# Patient Record
Sex: FEMALE | Race: WHITE | NOT HISPANIC OR LATINO | Employment: OTHER | URBAN - METROPOLITAN AREA
[De-identification: names, ages, dates, MRNs, and addresses within clinical notes are randomized per-mention and may not be internally consistent; named-entity substitution may affect disease eponyms.]

---

## 2022-08-23 ENCOUNTER — APPOINTMENT (OUTPATIENT)
Dept: CT IMAGING | Facility: HOSPITAL | Age: 42
End: 2022-08-23

## 2022-08-23 ENCOUNTER — APPOINTMENT (OUTPATIENT)
Dept: GENERAL RADIOLOGY | Facility: HOSPITAL | Age: 42
End: 2022-08-23

## 2022-08-23 ENCOUNTER — HOSPITAL ENCOUNTER (EMERGENCY)
Facility: HOSPITAL | Age: 42
Discharge: HOME | End: 2022-08-23
Attending: STUDENT IN AN ORGANIZED HEALTH CARE EDUCATION/TRAINING PROGRAM | Admitting: STUDENT IN AN ORGANIZED HEALTH CARE EDUCATION/TRAINING PROGRAM

## 2022-08-23 VITALS
OXYGEN SATURATION: 100 % | DIASTOLIC BLOOD PRESSURE: 78 MMHG | TEMPERATURE: 97.6 F | SYSTOLIC BLOOD PRESSURE: 111 MMHG | BODY MASS INDEX: 27.07 KG/M2 | WEIGHT: 182.76 LBS | HEIGHT: 69 IN | RESPIRATION RATE: 16 BRPM | HEART RATE: 62 BPM

## 2022-08-23 DIAGNOSIS — R91.1 PULMONARY NODULE 1 CM OR GREATER IN DIAMETER: Primary | ICD-10-CM

## 2022-08-23 LAB
ALBUMIN SERPL-MCNC: 4.7 G/DL (ref 3.5–5.2)
ALBUMIN/GLOB SERPL: 2 G/DL
ALP SERPL-CCNC: 51 U/L (ref 39–117)
ALT SERPL W P-5'-P-CCNC: 16 U/L (ref 1–33)
ANION GAP SERPL CALCULATED.3IONS-SCNC: 11.3 MMOL/L (ref 5–15)
AST SERPL-CCNC: 13 U/L (ref 1–32)
BASOPHILS # BLD AUTO: 0.02 10*3/MM3 (ref 0–0.2)
BASOPHILS NFR BLD AUTO: 0.4 % (ref 0–1.5)
BILIRUB SERPL-MCNC: 0.7 MG/DL (ref 0–1.2)
BUN SERPL-MCNC: 15 MG/DL (ref 6–20)
BUN/CREAT SERPL: 16.1 (ref 7–25)
CALCIUM SPEC-SCNC: 9.4 MG/DL (ref 8.6–10.5)
CHLORIDE SERPL-SCNC: 106 MMOL/L (ref 98–107)
CO2 SERPL-SCNC: 22.7 MMOL/L (ref 22–29)
CREAT SERPL-MCNC: 0.93 MG/DL (ref 0.57–1)
DEPRECATED RDW RBC AUTO: 36.9 FL (ref 37–54)
EGFRCR SERPLBLD CKD-EPI 2021: 79.4 ML/MIN/1.73
EOSINOPHIL # BLD AUTO: 0.04 10*3/MM3 (ref 0–0.4)
EOSINOPHIL NFR BLD AUTO: 0.8 % (ref 0.3–6.2)
ERYTHROCYTE [DISTWIDTH] IN BLOOD BY AUTOMATED COUNT: 11.5 % (ref 12.3–15.4)
GLOBULIN UR ELPH-MCNC: 2.4 GM/DL
GLUCOSE SERPL-MCNC: 86 MG/DL (ref 65–99)
HCT VFR BLD AUTO: 36.7 % (ref 34–46.6)
HGB BLD-MCNC: 12.7 G/DL (ref 12–15.9)
HOLD SPECIMEN: NORMAL
HOLD SPECIMEN: NORMAL
IMM GRANULOCYTES # BLD AUTO: 0.01 10*3/MM3 (ref 0–0.05)
IMM GRANULOCYTES NFR BLD AUTO: 0.2 % (ref 0–0.5)
LYMPHOCYTES # BLD AUTO: 1.78 10*3/MM3 (ref 0.7–3.1)
LYMPHOCYTES NFR BLD AUTO: 36.8 % (ref 19.6–45.3)
MCH RBC QN AUTO: 30.7 PG (ref 26.6–33)
MCHC RBC AUTO-ENTMCNC: 34.6 G/DL (ref 31.5–35.7)
MCV RBC AUTO: 88.6 FL (ref 79–97)
MONOCYTES # BLD AUTO: 0.4 10*3/MM3 (ref 0.1–0.9)
MONOCYTES NFR BLD AUTO: 8.3 % (ref 5–12)
NEUTROPHILS NFR BLD AUTO: 2.59 10*3/MM3 (ref 1.7–7)
NEUTROPHILS NFR BLD AUTO: 53.5 % (ref 42.7–76)
NRBC BLD AUTO-RTO: 0 /100 WBC (ref 0–0.2)
NT-PROBNP SERPL-MCNC: 242.9 PG/ML (ref 0–450)
PLATELET # BLD AUTO: 248 10*3/MM3 (ref 140–450)
PMV BLD AUTO: 9.6 FL (ref 6–12)
POTASSIUM SERPL-SCNC: 4 MMOL/L (ref 3.5–5.2)
PROT SERPL-MCNC: 7.1 G/DL (ref 6–8.5)
QT INTERVAL: 412 MS
RBC # BLD AUTO: 4.14 10*6/MM3 (ref 3.77–5.28)
SODIUM SERPL-SCNC: 140 MMOL/L (ref 136–145)
TROPONIN T SERPL-MCNC: <0.01 NG/ML (ref 0–0.03)
WBC NRBC COR # BLD: 4.84 10*3/MM3 (ref 3.4–10.8)
WHOLE BLOOD HOLD COAG: NORMAL
WHOLE BLOOD HOLD SPECIMEN: NORMAL

## 2022-08-23 PROCEDURE — 71045 X-RAY EXAM CHEST 1 VIEW: CPT

## 2022-08-23 PROCEDURE — 93005 ELECTROCARDIOGRAM TRACING: CPT | Performed by: STUDENT IN AN ORGANIZED HEALTH CARE EDUCATION/TRAINING PROGRAM

## 2022-08-23 PROCEDURE — 93005 ELECTROCARDIOGRAM TRACING: CPT

## 2022-08-23 PROCEDURE — 0 IOPAMIDOL PER 1 ML: Performed by: STUDENT IN AN ORGANIZED HEALTH CARE EDUCATION/TRAINING PROGRAM

## 2022-08-23 PROCEDURE — 85025 COMPLETE CBC W/AUTO DIFF WBC: CPT | Performed by: STUDENT IN AN ORGANIZED HEALTH CARE EDUCATION/TRAINING PROGRAM

## 2022-08-23 PROCEDURE — 36415 COLL VENOUS BLD VENIPUNCTURE: CPT | Performed by: STUDENT IN AN ORGANIZED HEALTH CARE EDUCATION/TRAINING PROGRAM

## 2022-08-23 PROCEDURE — 71260 CT THORAX DX C+: CPT

## 2022-08-23 PROCEDURE — 83880 ASSAY OF NATRIURETIC PEPTIDE: CPT

## 2022-08-23 PROCEDURE — 84484 ASSAY OF TROPONIN QUANT: CPT

## 2022-08-23 PROCEDURE — 80053 COMPREHEN METABOLIC PANEL: CPT | Performed by: STUDENT IN AN ORGANIZED HEALTH CARE EDUCATION/TRAINING PROGRAM

## 2022-08-23 PROCEDURE — 99283 EMERGENCY DEPT VISIT LOW MDM: CPT

## 2022-08-23 RX ORDER — SODIUM CHLORIDE 0.9 % (FLUSH) 0.9 %
10 SYRINGE (ML) INJECTION AS NEEDED
Status: DISCONTINUED | OUTPATIENT
Start: 2022-08-23 | End: 2022-08-23 | Stop reason: HOSPADM

## 2022-08-23 RX ADMIN — IOPAMIDOL 100 ML: 755 INJECTION, SOLUTION INTRAVENOUS at 13:12

## 2022-08-23 NOTE — ED PROVIDER NOTES
"Time: 11:56 AM EDT  Arrived by: private car  Chief Complaint: shortness of breath  History provided by: patient  History is limited by: N/A     History of Present Illness:  Patient is a 42 y.o. year old female who presents to the emergency department with shortness of breath. Pt reports having thoracic outlet syndrome, she had surgery and since then she has had shortness of breath with ambulation.  Surgery was 3 months ago.  At her primary care office they noted an opacity in the left lung field.  Pt has daily SOB and wheezing.        used: No            Patient Care Team  Primary Care Provider: Meredith Maddox MD    Past Medical History:     No Known Allergies  History reviewed. No pertinent past medical history.  History reviewed. No pertinent surgical history.  History reviewed. No pertinent family history.    Home Medications:  Prior to Admission medications    Not on File        Social History:   Social History     Tobacco Use   • Smoking status: Never Smoker   Substance Use Topics   • Alcohol use: Never   • Drug use: Never     Recent travel: no     Review of Systems:  Review of Systems   Constitutional: Negative for chills and fever.   HENT: Negative for congestion, rhinorrhea and sore throat.    Eyes: Negative for pain and visual disturbance.   Respiratory: Positive for shortness of breath and wheezing. Negative for apnea, cough and chest tightness.    Cardiovascular: Negative for chest pain and palpitations.   Gastrointestinal: Negative for abdominal pain, diarrhea, nausea and vomiting.   Genitourinary: Negative for difficulty urinating and dysuria.   Musculoskeletal: Negative for joint swelling and myalgias.   Skin: Negative for color change.   Neurological: Negative for seizures and headaches.   Psychiatric/Behavioral: Negative.    All other systems reviewed and are negative.       Physical Exam:  /78   Pulse 62   Temp 97.6 °F (36.4 °C) (Oral)   Resp 16   Ht 175.3 cm (69\")   " Wt 82.9 kg (182 lb 12.2 oz)   SpO2 100%   BMI 26.99 kg/m²     Physical Exam  Vitals and nursing note reviewed.   Constitutional:       General: She is not in acute distress.     Appearance: Normal appearance. She is not toxic-appearing.   HENT:      Head: Normocephalic and atraumatic.      Jaw: There is normal jaw occlusion.   Eyes:      General: Lids are normal.      Extraocular Movements: Extraocular movements intact.      Conjunctiva/sclera: Conjunctivae normal.      Pupils: Pupils are equal, round, and reactive to light.   Cardiovascular:      Rate and Rhythm: Normal rate and regular rhythm.      Pulses: Normal pulses.      Heart sounds: Normal heart sounds.   Pulmonary:      Effort: Pulmonary effort is normal. No respiratory distress.      Breath sounds: No wheezing or rhonchi.   Abdominal:      General: Abdomen is flat.      Palpations: Abdomen is soft.      Tenderness: There is no abdominal tenderness. There is no guarding or rebound.   Musculoskeletal:         General: Normal range of motion.      Cervical back: Normal range of motion and neck supple.      Right lower leg: No edema.      Left lower leg: No edema.   Skin:     General: Skin is warm and dry.   Neurological:      Mental Status: She is alert and oriented to person, place, and time. Mental status is at baseline.   Psychiatric:         Mood and Affect: Mood normal.                Medications in the Emergency Department:  Medications   sodium chloride 0.9 % flush 10 mL (has no administration in time range)   iopamidol (ISOVUE-370) 76 % injection 100 mL (100 mL Intravenous Given 8/23/22 1312)        Labs  Lab Results (last 24 hours)     Procedure Component Value Units Date/Time    CBC & Differential [[id number]]  (Abnormal) Collected: 08/23/22 0904    Specimen: Blood Updated: 08/23/22 0912    Narrative:      The following orders were created for panel order CBC & Differential.  Procedure                               Abnormality         Status                      ---------                               -----------         ------                     CBC Auto Differential[[id number]]        Abnormal            Final result                 Please view results for these tests on the individual orders.    Comprehensive Metabolic Panel [[id number]] Collected: 08/23/22 0904    Specimen: Blood Updated: 08/23/22 0937     Glucose 86 mg/dL      BUN 15 mg/dL      Creatinine 0.93 mg/dL      Sodium 140 mmol/L      Potassium 4.0 mmol/L      Chloride 106 mmol/L      CO2 22.7 mmol/L      Calcium 9.4 mg/dL      Total Protein 7.1 g/dL      Albumin 4.70 g/dL      ALT (SGPT) 16 U/L      AST (SGOT) 13 U/L      Alkaline Phosphatase 51 U/L      Total Bilirubin 0.7 mg/dL      Globulin 2.4 gm/dL      A/G Ratio 2.0 g/dL      BUN/Creatinine Ratio 16.1     Anion Gap 11.3 mmol/L      eGFR 79.4 mL/min/1.73      Comment: National Kidney Foundation and American Society of Nephrology (ASN) Task Force recommended calculation based on the Chronic Kidney Disease Epidemiology Collaboration (CKD-EPI) equation refit without adjustment for race.       Narrative:      GFR Normal >60  Chronic Kidney Disease <60  Kidney Failure <15      BNP [[id number]]  (Normal) Collected: 08/23/22 0904    Specimen: Blood Updated: 08/23/22 0933     proBNP 242.9 pg/mL     Narrative:      Among patients with dyspnea, NT-proBNP is highly sensitive for the detection of acute congestive heart failure. In addition NT-proBNP of <300 pg/ml effectively rules out acute congestive heart failure with 99% negative predictive value.    Results may be falsely decreased if patient taking Biotin.      Troponin [[id number]]  (Normal) Collected: 08/23/22 0904    Specimen: Blood Updated: 08/23/22 0935     Troponin T <0.010 ng/mL     Narrative:      Troponin T Reference Range:  <= 0.03 ng/mL-   Negative for AMI  >0.03 ng/mL-     Abnormal for myocardial necrosis.  Clinicians would have to utilize clinical acumen, EKG, Troponin and serial changes  to determine if it is an Acute Myocardial Infarction or myocardial injury due to an underlying chronic condition.       Results may be falsely decreased if patient taking Biotin.      CBC Auto Differential [[id number]]  (Abnormal) Collected: 08/23/22 0904    Specimen: Blood Updated: 08/23/22 0912     WBC 4.84 10*3/mm3      RBC 4.14 10*6/mm3      Hemoglobin 12.7 g/dL      Hematocrit 36.7 %      MCV 88.6 fL      MCH 30.7 pg      MCHC 34.6 g/dL      RDW 11.5 %      RDW-SD 36.9 fl      MPV 9.6 fL      Platelets 248 10*3/mm3      Neutrophil % 53.5 %      Lymphocyte % 36.8 %      Monocyte % 8.3 %      Eosinophil % 0.8 %      Basophil % 0.4 %      Immature Grans % 0.2 %      Neutrophils, Absolute 2.59 10*3/mm3      Lymphocytes, Absolute 1.78 10*3/mm3      Monocytes, Absolute 0.40 10*3/mm3      Eosinophils, Absolute 0.04 10*3/mm3      Basophils, Absolute 0.02 10*3/mm3      Immature Grans, Absolute 0.01 10*3/mm3      nRBC 0.0 /100 WBC            Imaging:  CT Chest With Contrast Diagnostic    Result Date: 8/23/2022  PROCEDURE: CT CHEST W CONTRAST DIAGNOSTIC  COMPARISON:  [facility], CR, XR CHEST 1 VW, 8/23/2022, 10:22. INDICATIONS: shortness of breath,CHEST PAIN BELOW LEFT BREAST,CHEST SURGERY 3 MONTHS AGO FOR THORACIC OUTLET SYNDROME  TECHNIQUE: After obtaining the patient's consent, CT images were obtained with non-ionic intravenous contrast material.   PROTOCOL:   Pulmonary embolism imaging protocol performed    RADIATION:   DLP: 344mGy*cm   Automated exposure control was utilized to minimize radiation dose. CONTRAST: 100cc Isovue 370 I.V.  FINDINGS:  Chest CT demonstrates no definite evidence for pulmonary embolism.  Vessels in left lower lobe demonstrate less contrast another vessels.  There is elevation of the left hemidiaphragm.  No enlarged mediastinal adenopathy.  There are prominent bilateral lymph nodes.  There is a 1 centimeter nodule or lymph node adjacent in the right lower lobe adjacent to the  right pulmonary vein and slightly inferior right pulmonary vein.  There may be a trace left pleural effusion.  The aorta appears normal.  There is minimal reflux of contrast in the hepatic veins.  There is elevation left hemidiaphragm.  There is airspace opacity in the left lower lobe.  There is some volume loss in the left lower lobe.  The heart is prominent size.        1. No definite acute pulmonary embolism. 2. There is elevation of the left hemidiaphragm.  There is volume loss in the left lower lobe.  There is airspace opacity left lower lobe compatible with atelectasis and/or pneumonia. 3. 1 centimeter nodule inferior to the right pulmonary vein in the right lower lobe.  This could be a lung nodule or lymph node.  Recommend continued attention on short-term follow-up in about 3 months. 4. Trace left pleural effusion. 5. Minimal reflux of contrast into the hepatic veins.  Borderline hepatomegaly. 6. Not mentioned above there appear to be postoperative changes of the left 1st rib and left superior thorax.   Electronically Signed and Approved By: COLLINS BECK MD on 8/23/2022 at 14:05          XR Chest 1 View    Result Date: 8/23/2022  PROCEDURE: XR CHEST 1 VW  COMPARISON: None  INDICATIONS: short of breath  FINDINGS:  There is mild airspace opacity in the left lung base the right lung is clear.  A small focal airspace opacities noted near the left lung apex.  Multiple surgical clips project over the left lung apex.  The right lung is clear.  The cardiac and mediastinal silhouettes appear normal.        1. Airspace opacities in the upper and lower left lung field.  Pneumonia, scarring and atelectasis are all in the differential.  Follow-up chest PA and lateral in 6 weeks is recommended to confirm resolution.  If findings do not resolve, consider chest CT to further evaluate. 2. Postoperative changes projecting over the upper left thorax       Clive Escalera M.D.       Electronically Signed and Approved By:  Clive Escalera M.D. on 8/23/2022 at 10:53               Procedures:  Procedures    Progress  ED Course as of 08/23/22 1525   e Aug 23, 2022   1156 EKG normal sinus rhythm heart rate 55  no sign of ST elevation or depression. [LQ]      ED Course User Index  [LQ] Keya Rivera MD                            Medical Decision Making:  MDM  Number of Diagnoses or Management Options  Pulmonary nodule 1 cm or greater in diameter  Diagnosis management comments: ddx: Pneumonia, pneumothorax, ACS, PE, asthma, COPD    X-ray showed possible opacities on the left side.  We obtained a CT which was negative for PE.  Did show a right-sided pulmonary nodule.  No sign of pneumonia.  She continues to have atelectasis which may be a component of her symptoms.  She is using a spirometer.  We discussed follow-up for the nodule and follow-up with pulmonology.  CMP shows normal creatinine, no significant electrolyte abnormality with normal sodium and potassium levels.  CBC shows, no leukocytosis, hemoglobin within normal limits, normal platelets, and no other concerning findings.  Patient oxygenating 100% on room air.       Amount and/or Complexity of Data Reviewed  Clinical lab tests: reviewed  Tests in the radiology section of CPT®: reviewed  Tests in the medicine section of CPT®: reviewed    Patient Progress  Patient progress: stable       Final diagnoses:   Pulmonary nodule 1 cm or greater in diameter        Disposition:  ED Disposition     ED Disposition   Discharge    Condition   Stable    Comment   --             This medical record created using voice recognition software.           Camille García  08/23/22 1203       Keya Rivera MD  08/23/22 1526

## 2022-08-23 NOTE — DISCHARGE INSTRUCTIONS
Recommended 3-month follow-up for right-sided pulmonary nodule.  If you have worsening shortness of breath or chest pain, return to the emergency department.

## 2022-09-26 ENCOUNTER — TRANSCRIBE ORDERS (OUTPATIENT)
Dept: ADMINISTRATIVE | Facility: HOSPITAL | Age: 42
End: 2022-09-26

## 2022-09-26 DIAGNOSIS — J45.30 MILD PERSISTENT ASTHMA, WELL CONTROLLED: Primary | ICD-10-CM

## 2022-10-03 ENCOUNTER — HOSPITAL ENCOUNTER (OUTPATIENT)
Dept: RESPIRATORY THERAPY | Facility: HOSPITAL | Age: 42
Discharge: HOME | End: 2022-10-03
Admitting: INTERNAL MEDICINE

## 2022-10-03 DIAGNOSIS — J45.30 MILD PERSISTENT ASTHMA, WELL CONTROLLED: ICD-10-CM

## 2022-10-03 PROCEDURE — 94729 DIFFUSING CAPACITY: CPT

## 2022-10-03 PROCEDURE — 94726 PLETHYSMOGRAPHY LUNG VOLUMES: CPT

## 2022-10-03 PROCEDURE — 94060 EVALUATION OF WHEEZING: CPT

## 2022-10-03 RX ORDER — ALBUTEROL SULFATE 2.5 MG/3ML
2.5 SOLUTION RESPIRATORY (INHALATION) ONCE
Status: COMPLETED | OUTPATIENT
Start: 2022-10-03 | End: 2022-10-03

## 2022-10-03 RX ADMIN — ALBUTEROL SULFATE 2.5 MG: 2.5 SOLUTION RESPIRATORY (INHALATION) at 15:01

## 2022-10-21 ENCOUNTER — TRANSCRIBE ORDERS (OUTPATIENT)
Dept: LAB | Facility: HOSPITAL | Age: 42
End: 2022-10-21

## 2022-10-21 ENCOUNTER — LAB (OUTPATIENT)
Dept: LAB | Facility: HOSPITAL | Age: 42
End: 2022-10-21

## 2022-10-21 DIAGNOSIS — E78.5 HYPERLIPIDEMIA, UNSPECIFIED HYPERLIPIDEMIA TYPE: ICD-10-CM

## 2022-10-21 DIAGNOSIS — E78.5 HYPERLIPIDEMIA, UNSPECIFIED HYPERLIPIDEMIA TYPE: Primary | ICD-10-CM

## 2022-10-21 LAB
ALBUMIN SERPL-MCNC: 4.5 G/DL (ref 3.5–5.2)
ALBUMIN/GLOB SERPL: 1.9 G/DL
ALP SERPL-CCNC: 46 U/L (ref 39–117)
ALT SERPL W P-5'-P-CCNC: 15 U/L (ref 1–33)
ANION GAP SERPL CALCULATED.3IONS-SCNC: 6.9 MMOL/L (ref 5–15)
AST SERPL-CCNC: 17 U/L (ref 1–32)
BILIRUB SERPL-MCNC: 0.7 MG/DL (ref 0–1.2)
BUN SERPL-MCNC: 14 MG/DL (ref 6–20)
BUN/CREAT SERPL: 15.4 (ref 7–25)
CALCIUM SPEC-SCNC: 9.2 MG/DL (ref 8.6–10.5)
CHLORIDE SERPL-SCNC: 106 MMOL/L (ref 98–107)
CHOLEST SERPL-MCNC: 138 MG/DL (ref 0–200)
CO2 SERPL-SCNC: 25.1 MMOL/L (ref 22–29)
CREAT SERPL-MCNC: 0.91 MG/DL (ref 0.57–1)
EGFRCR SERPLBLD CKD-EPI 2021: 81.4 ML/MIN/1.73
ERYTHROCYTE [SEDIMENTATION RATE] IN BLOOD: 2 MM/HR (ref 0–20)
GLOBULIN UR ELPH-MCNC: 2.4 GM/DL
GLUCOSE SERPL-MCNC: 100 MG/DL (ref 65–99)
HDLC SERPL-MCNC: 65 MG/DL (ref 40–60)
LDLC SERPL CALC-MCNC: 62 MG/DL (ref 0–100)
LDLC/HDLC SERPL: 0.98 {RATIO}
POTASSIUM SERPL-SCNC: 4.9 MMOL/L (ref 3.5–5.2)
PROT SERPL-MCNC: 6.9 G/DL (ref 6–8.5)
SODIUM SERPL-SCNC: 138 MMOL/L (ref 136–145)
TRIGL SERPL-MCNC: 46 MG/DL (ref 0–150)
VLDLC SERPL-MCNC: 11 MG/DL (ref 5–40)

## 2022-10-21 PROCEDURE — 80053 COMPREHEN METABOLIC PANEL: CPT

## 2022-10-21 PROCEDURE — 85652 RBC SED RATE AUTOMATED: CPT

## 2022-10-21 PROCEDURE — 80061 LIPID PANEL: CPT

## 2022-10-21 PROCEDURE — 36415 COLL VENOUS BLD VENIPUNCTURE: CPT

## 2023-01-04 ENCOUNTER — OFFICE (AMBULATORY)
Dept: URBAN - METROPOLITAN AREA CLINIC 76 | Facility: CLINIC | Age: 43
End: 2023-01-04
Payer: OTHER GOVERNMENT

## 2023-01-04 VITALS
HEIGHT: 69 IN | HEART RATE: 66 BPM | DIASTOLIC BLOOD PRESSURE: 84 MMHG | SYSTOLIC BLOOD PRESSURE: 131 MMHG | WEIGHT: 178 LBS

## 2023-01-04 DIAGNOSIS — K59.09 OTHER CONSTIPATION: ICD-10-CM

## 2023-01-04 DIAGNOSIS — R13.10 DYSPHAGIA, UNSPECIFIED: ICD-10-CM

## 2023-01-04 DIAGNOSIS — K21.9 GASTRO-ESOPHAGEAL REFLUX DISEASE WITHOUT ESOPHAGITIS: ICD-10-CM

## 2023-01-04 PROCEDURE — 99204 OFFICE O/P NEW MOD 45 MIN: CPT | Performed by: INTERNAL MEDICINE

## 2023-01-04 RX ORDER — PANTOPRAZOLE SODIUM 40 MG/1
40 TABLET, DELAYED RELEASE ORAL
Qty: 90 | Refills: 3 | Status: COMPLETED
Start: 2023-01-04 | End: 2024-02-27

## 2023-04-10 ENCOUNTER — OFFICE (AMBULATORY)
Dept: URBAN - METROPOLITAN AREA CLINIC 76 | Facility: CLINIC | Age: 43
End: 2023-04-10
Payer: OTHER GOVERNMENT

## 2023-04-10 VITALS
WEIGHT: 181 LBS | SYSTOLIC BLOOD PRESSURE: 148 MMHG | HEIGHT: 69 IN | DIASTOLIC BLOOD PRESSURE: 96 MMHG | DIASTOLIC BLOOD PRESSURE: 100 MMHG | HEART RATE: 98 BPM | OXYGEN SATURATION: 99 % | SYSTOLIC BLOOD PRESSURE: 152 MMHG

## 2023-04-10 DIAGNOSIS — R13.10 DYSPHAGIA, UNSPECIFIED: ICD-10-CM

## 2023-04-10 DIAGNOSIS — K59.09 OTHER CONSTIPATION: ICD-10-CM

## 2023-04-10 DIAGNOSIS — K21.9 GASTRO-ESOPHAGEAL REFLUX DISEASE WITHOUT ESOPHAGITIS: ICD-10-CM

## 2023-04-10 PROCEDURE — 99214 OFFICE O/P EST MOD 30 MIN: CPT

## 2023-04-10 RX ORDER — PLECANATIDE 3 MG/1
3 TABLET ORAL
Qty: 90 | Refills: 3 | Status: COMPLETED
Start: 2023-04-10 | End: 2024-02-27

## 2023-06-28 PROBLEM — R29.818 SUSPECTED SLEEP APNEA: Status: ACTIVE | Noted: 2023-06-28

## 2023-06-28 PROBLEM — E66.3 OVERWEIGHT (BMI 25.0-29.9): Status: ACTIVE | Noted: 2023-06-28

## 2023-06-28 PROBLEM — J45.20 INTERMITTENT ASTHMA WITHOUT COMPLICATION: Status: ACTIVE | Noted: 2023-06-28

## 2023-06-28 PROBLEM — R06.81 WITNESSED APNEIC SPELLS: Status: ACTIVE | Noted: 2023-06-28

## 2023-06-28 PROBLEM — G47.10 HYPERSOMNIA: Status: ACTIVE | Noted: 2023-06-28

## 2023-06-28 PROBLEM — R06.83 SNORING: Status: ACTIVE | Noted: 2023-06-28

## 2023-07-28 ENCOUNTER — HOSPITAL ENCOUNTER (OUTPATIENT)
Dept: SLEEP MEDICINE | Facility: HOSPITAL | Age: 43
Discharge: HOME | End: 2023-07-28
Admitting: INTERNAL MEDICINE
Payer: OTHER GOVERNMENT

## 2023-07-28 ENCOUNTER — TREATMENT (OUTPATIENT)
Dept: PHYSICAL THERAPY | Facility: CLINIC | Age: 43
End: 2023-07-28
Payer: OTHER GOVERNMENT

## 2023-07-28 DIAGNOSIS — R29.818 SUSPECTED SLEEP APNEA: ICD-10-CM

## 2023-07-28 DIAGNOSIS — G47.10 HYPERSOMNIA: ICD-10-CM

## 2023-07-28 DIAGNOSIS — R06.83 SNORING: ICD-10-CM

## 2023-07-28 DIAGNOSIS — R06.81 WITNESSED APNEIC SPELLS: ICD-10-CM

## 2023-07-28 DIAGNOSIS — E66.3 OVERWEIGHT (BMI 25.0-29.9): ICD-10-CM

## 2023-07-28 DIAGNOSIS — J45.20 INTERMITTENT ASTHMA WITHOUT COMPLICATION, UNSPECIFIED ASTHMA SEVERITY: ICD-10-CM

## 2023-07-28 DIAGNOSIS — M25.531 RIGHT WRIST PAIN: Primary | ICD-10-CM

## 2023-07-28 PROCEDURE — 97112 NEUROMUSCULAR REEDUCATION: CPT | Performed by: PHYSICAL THERAPIST

## 2023-07-28 PROCEDURE — 97110 THERAPEUTIC EXERCISES: CPT | Performed by: PHYSICAL THERAPIST

## 2023-07-28 PROCEDURE — 95806 SLEEP STUDY UNATT&RESP EFFT: CPT

## 2023-07-28 NOTE — PROGRESS NOTES
Occupational Therapy Daily Treatment Note  [address] [place] [id number]      Patient: Domenica Alcala   : 1980  Referring practitioner: Meredith Maddox MD  Date of Initial Visit: Type: THERAPY  Noted: 2023  Today's Date: 2023  Patient seen for 4 sessions         Subjective   Domenica Alcala reports: its about the same.    Objective   See Exercise, Manual, and Modality Logs for complete treatment.     Pt performed range of motion, dynamic stabilization exercises and desensitization well.  Assessment/Plan    Visit Diagnoses:    ICD-10-CM ICD-9-CM   1. Right wrist pain  M25.531 719.43       Continue per POC         Timed:  Manual Therapy:    5     mins  21597;  Therapeutic Exercise:    10     mins  18372;     Therapeutic Activity:    10     mins  29073;  Ultrasound:     0     mins  07868;    Electrical Stimulation:    0     mins 39653;  Neuromuscular Candy:    10    mins  [id number];      Timed Treatment:   35   mins   Total Treatment:     35   min    DILMA Ortiz/L  Occupational Therapist    Electronically signed   License number [id number]

## 2023-08-01 ENCOUNTER — TREATMENT (OUTPATIENT)
Dept: PHYSICAL THERAPY | Facility: CLINIC | Age: 43
End: 2023-08-01
Payer: OTHER GOVERNMENT

## 2023-08-01 DIAGNOSIS — M25.531 RIGHT WRIST PAIN: Primary | ICD-10-CM

## 2023-08-01 PROCEDURE — 97110 THERAPEUTIC EXERCISES: CPT | Performed by: PHYSICAL THERAPIST

## 2023-08-01 PROCEDURE — 97112 NEUROMUSCULAR REEDUCATION: CPT | Performed by: PHYSICAL THERAPIST

## 2023-08-03 ENCOUNTER — TREATMENT (OUTPATIENT)
Dept: PHYSICAL THERAPY | Facility: CLINIC | Age: 43
End: 2023-08-03
Payer: OTHER GOVERNMENT

## 2023-08-03 DIAGNOSIS — M25.531 RIGHT WRIST PAIN: Primary | ICD-10-CM

## 2023-08-03 PROCEDURE — 97110 THERAPEUTIC EXERCISES: CPT | Performed by: PHYSICAL THERAPIST

## 2023-08-03 PROCEDURE — 97112 NEUROMUSCULAR REEDUCATION: CPT | Performed by: PHYSICAL THERAPIST

## 2023-08-08 ENCOUNTER — TREATMENT (OUTPATIENT)
Dept: PHYSICAL THERAPY | Facility: CLINIC | Age: 43
End: 2023-08-08
Payer: OTHER GOVERNMENT

## 2023-08-08 DIAGNOSIS — M25.531 RIGHT WRIST PAIN: Primary | ICD-10-CM

## 2023-08-08 PROCEDURE — 97112 NEUROMUSCULAR REEDUCATION: CPT | Performed by: PHYSICAL THERAPIST

## 2023-08-08 PROCEDURE — 97110 THERAPEUTIC EXERCISES: CPT | Performed by: PHYSICAL THERAPIST

## 2023-08-08 NOTE — PROGRESS NOTES
Occupational Therapy Daily Treatment Note/Progress Note  [address] [place] [address]      Patient: Domenica Alcala   : 1980  Referring practitioner: Meredith Maddox MD  Date of Initial Visit: Type: THERAPY  Noted: 2023  Today's Date: 2023  Patient seen for 7 sessions         Subjective Evaluation    Pain  Current pain ratin  Location: ulnar side of wrist       Domenica Alcala reports: no changes.    Objective          Observations     Right Wrist/Hand   Negative for deformity and edema.       Tenderness     Additional Tenderness Details  No tenderness to palpation    Neurological Testing     Sensation     Wrist/Hand     Right   Intact: light touch    Additional Neurological Details  Occasional Tingling in small finger    Active Range of Motion     Left Wrist   Wrist flexion: 80 degrees   Wrist extension: 55 degrees   Radial deviation: 15 degrees   Ulnar deviation: 40 degrees     Right Wrist   Wrist flexion: 80 degrees   Wrist extension: 55 degrees   Radial deviation: 15 degrees   Ulnar deviation: 30 degrees     Strength/Myotome Testing     Left Wrist/Hand      (2nd hand position)     Trial 1: 66 lbs    Trial 2: 67 lbs    Trial 3: 67 lbs    Average: 66.67 lbs    Thumb Strength  Key/Lateral Pinch     Trial 1: 20 lbs    Trial 2: 18 lbs    Trial 3: 19 lbs    Average: 19 lbs    Right Wrist/Hand   Wrist extension: 4+  Wrist flexion: 4+     (2nd hand position)     Trial 1: 64 lbs    Trial 2: 57 lbs    Trial 3: 69 lbs    Average: 63.33 lbs    Thumb Strength   Key/Lateral Pinch     Trial 1: 20 lbs    Trial 2: 20 lbs    Trial 3: 20 lbs    Average: 20 lbs    Tests     Right Elbow   Positive Tinel's sign (cubital tunnel).     Additional Tests Details  + Tinels Guyon's canal    See Exercise, Manual, and Modality Logs for complete treatment.     Increased numbness with wrist 3 planes but did tolerate increased resistance.  Assessment & Plan     Assessment  Impairments: abnormal coordination,  abnormal or restricted ROM, activity intolerance, impaired physical strength and pain with function  Other impairment: tucking in shirt, cutting food  Functional Limitations: carrying objects, lifting, pulling and pushingPrognosis: good    Goals  Plan Goals: 1. The patient complains of pain in the R hand.                  LTG 1: 12 weeks:  The patient will report a pain rating of 1/10 or better at worst in order to improve tolerance to cutting food and working out.                                  STATUS:  Not met                  STG 1a: 6 weeks:  The patient will report a pain rating of 3/10 or better at worst.                                   STATUS:  Not met  2. The patient has limited ROM of the R wrist.                  LTG 2: 12 weeks:  The patient will demonstrate 80 degrees of wrist flexion and 55 degrees extension to allow the patient to open jars.                                  STATUS:  Met                   STG 2a: 6 weeks:  The patient will demonstrate independence with HEP for range of motion exercises.                                  STATUS:  Met                             3. The patient has limited strength of the R hand.                  LTG 3: 12 weeks:  The patient will demonstrate MMT 5/5 and  80 lbs strength  in order to open jars and cut food.                                  STATUS:  Not met                  STG 3a: 6 weeks:  The patient will demonstrate 75 lbs R  strength.                                  STATUS:  Not met  4. Carrying, Moving, and Handling Objects Functional Limitation                                    LTG 4: 12 weeks:  The patient will achieve a score of 11/55 on the Quick DASH.                                  STATUS:  New                  STG 4a: 6 weeks:  The patient will achieve a score of 19/55 on the Quick DASH.                                    STATUS:  New                        Plan  Planned modality interventions: TENS, thermotherapy (hydrocollator  packs) and thermotherapy (paraffin bath)  Planned therapy interventions: manual therapy, functional ROM exercises, fine motor coordination training, flexibility, stretching, strengthening, home exercise program, neuromuscular re-education, soft tissue mobilization and therapeutic activities  Frequency: 2x week  Duration in weeks: 4  Treatment plan discussed with: patient      Visit Diagnoses:    ICD-10-CM ICD-9-CM   1. Right wrist pain  M25.531 719.43                Timed:  Manual Therapy:    0     mins  05498;  Therapeutic Exercise:    10     mins  15650;     Therapeutic Activity:    10     mins  06342;  Ultrasound:     0     mins  28587;    Electrical Stimulation:    0     mins 37733;  Neuromuscular Candy:    10    mins  80393;      Timed Treatment:   30   mins   Total Treatment:     30   min    DILMA Ortiz/L  Occupational Therapist    Electronically signed   License number [id number]

## 2023-08-18 ENCOUNTER — TREATMENT (OUTPATIENT)
Dept: PHYSICAL THERAPY | Facility: CLINIC | Age: 43
End: 2023-08-18
Payer: OTHER GOVERNMENT

## 2023-08-18 DIAGNOSIS — M25.531 RIGHT WRIST PAIN: Primary | ICD-10-CM

## 2023-08-18 PROCEDURE — 97140 MANUAL THERAPY 1/> REGIONS: CPT | Performed by: PHYSICAL THERAPIST

## 2023-08-18 PROCEDURE — 97110 THERAPEUTIC EXERCISES: CPT | Performed by: PHYSICAL THERAPIST

## 2023-08-18 NOTE — PROGRESS NOTES
Occupational Therapy Daily Treatment Note  [address] [place] [id number]      Patient: Domenica Alcala   : 1980  Referring practitioner: Meredith Maddox MD  Date of Initial Visit: Type: THERAPY  Noted: 2023  Today's Date: 2023  Patient seen for 8 sessions         Subjective Evaluation    Pain  Current pain ratin  At worst pain ratin  Location: R wrist       Domenica Alcala reports: no changes and she has appointment with doctor on .    Objective   See Exercise, Manual, and Modality Logs for complete treatment.     Kinesio tape used again today over TFCC with 50% pull in center to decrease pain.  Assessment/Plan    Visit Diagnoses:    ICD-10-CM ICD-9-CM   1. Right wrist pain  M25.531 719.43       Pt to follow up with physician regarding further treatment.         Timed:  Manual Therapy:    8     mins  56684;  Therapeutic Exercise:    10     mins  36837;     Therapeutic Activity:    5     mins  03539;  Ultrasound:     0     mins  07560;    Electrical Stimulation:    0     mins 04707;  Neuromuscular Candy:    5    mins  [id number];      Timed Treatment:   28   mins   Total Treatment:     28   min    Theresa Zhang OTR/L  Occupational Therapist    Electronically signed   License number [id number]

## 2023-08-22 ENCOUNTER — OFFICE VISIT (OUTPATIENT)
Dept: SLEEP MEDICINE | Facility: HOSPITAL | Age: 43
End: 2023-08-22
Payer: OTHER GOVERNMENT

## 2023-08-22 VITALS
HEIGHT: 69 IN | HEART RATE: 61 BPM | OXYGEN SATURATION: 100 % | WEIGHT: 186 LBS | BODY MASS INDEX: 27.55 KG/M2 | DIASTOLIC BLOOD PRESSURE: 77 MMHG | SYSTOLIC BLOOD PRESSURE: 124 MMHG

## 2023-08-22 DIAGNOSIS — G47.14 HYPERSOMNIA DUE TO ANOTHER MEDICAL CONDITION: Primary | ICD-10-CM

## 2023-08-22 DIAGNOSIS — R06.83 SNORING: ICD-10-CM

## 2023-08-22 DIAGNOSIS — E66.3 OVERWEIGHT (BMI 25.0-29.9): ICD-10-CM

## 2023-08-22 PROCEDURE — G0463 HOSPITAL OUTPT CLINIC VISIT: HCPCS

## 2023-08-22 RX ORDER — FAMOTIDINE 20 MG/1
20 TABLET, FILM COATED ORAL DAILY
COMMUNITY
Start: 2023-06-29

## 2023-08-22 RX ORDER — DICLOFENAC SODIUM 75 MG/1
TABLET, DELAYED RELEASE ORAL AS NEEDED
COMMUNITY
Start: 2023-08-04

## 2023-08-22 NOTE — PROGRESS NOTES
Sleep Disorder Follow Up    Patient Name: Domenica Alcala  Age/Sex: 43 y.o. female  : 1980  MRN: [id number]    Date of Encounter Visit: 23   Referring Provider: No ref. provider found  Place of Service: [facility]  Patient Care Team:  Meredith Maddox MD as PCP - General (Internal Medicine)    PROBLEM LIST:  Hypersomnia  Snoring with no evidence of sleep apnea  Overweight    History of Present Illness:  Domenica Alcala is a 43 y.o. female who is here for follow up on sleep study results. Patient was last seen in the office on 2023.  Since last visit, home sleep study on 2023 that showed no evidence of significant sleep apnea with overall AHI of 2.2 with clinically insignificant hypoxemia with only 0.3-minute in the hypoxemic range  Yet patient still have significant hypersomnia with [place] Sleepiness Scale of 17.  Patient denies any significant changes in the medical history since last visit  [place] Sleepiness Scale (ESS) is 17.  Compliance download was reviewed and documented below.  Weight is up by 4 pounds since last visit.  Patient has reported adequate sleep time with no improvement in the sleepiness by increasing time in bed or total sleep time.  Patient denies any change in her medical history since last visit  Patient is not on any antidepressant or any medication that can affect the results of the multiple sleep latency test  Other comorbidities include intermittent asthma without complication    Review of Systems:   A ten-system review was conducted and was negative except for the following: Dry mouth, acid reflux with heartburn, and morning headache.        Past Medical History:  Past medical, surgical, social, and family history, except as mentioned above, was unchanged from the last visit.     Past Medical History:   Diagnosis Date    Acid reflux     Arthritis     Asthma        No past surgical history on file.    Home Medications:     Current Outpatient  "Medications:     diclofenac (VOLTAREN) 75 MG EC tablet, As Needed., Disp: , Rfl:     famotidine (PEPCID) 20 MG tablet, 1 tablet Daily., Disp: , Rfl:     Acetaminophen (Tylenol) 325 MG capsule, As Needed., Disp: , Rfl:     Advair Diskus 100-50 MCG/ACT DISKUS, , Disp: , Rfl:     montelukast (SINGULAIR) 10 MG tablet, Take 1 tablet by mouth Every Night., Disp: , Rfl:     naproxen (NAPROSYN) 500 MG tablet, , Disp: , Rfl:     pantoprazole (PROTONIX) 40 MG EC tablet, , Disp: , Rfl:     ProAir  (90 Base) MCG/ACT inhaler, , Disp: , Rfl:     Allergies:  No Known Allergies    Past Social History:  Social History     Socioeconomic History    Marital status:    Tobacco Use    Smoking status: Never    Smokeless tobacco: Never   Vaping Use    Vaping Use: Never used   Substance and Sexual Activity    Alcohol use: Never    Drug use: Never    Sexual activity: Defer       Past Family History:  Family History   Problem Relation Age of Onset    Sleep apnea Mother     Sleep apnea Father          Objective:        Vital Signs:   Visit Vitals  /77   Pulse 61   Ht 175.3 cm (69.02\")   Wt 84.4 kg (186 lb)   SpO2 100%   BMI 27.45 kg/mý     Wt Readings from Last 3 Encounters:   08/22/23 84.4 kg (186 lb)   06/28/23 82.6 kg (182 lb 3.2 oz)   08/23/22 82.9 kg (182 lb 12.2 oz)          Physical Exam:   GEN:  No acute distress, alert, cooperative, well developed   EYES:   Sclerae clear. No icterus. PERRL. Normal EOM  ENT:   External ears/nose normal, no oral lesions, no thrush, mucous membranes moist, Septum midline. Mallampati III airway.    NECK:  Supple, midline trachea, no JVD  LUNGS: Normal chest on inspection, CTAB, no wheezes. No rhonchi. No crackles. Respirations regular, even and unlabored.   CV:  Regular rhythm and rate. Normal S1/S2. No murmurs, gallops, or rubs noted.  ABD:  Soft, nontender and nondistended. Normal bowel sounds. No guarding  EXT:  Moves all extremities well. No cyanosis. No redness. No edema.   Skin: " Dry, intact, no bleeding    Diagnostic Data:    Respiratory Disturbance Events:   This is a home sleep study done on 7/28/2023  Patient had a reported weight of 82.6 kg on the night of the study with a BMI of 26.9  Total recording time 520.8 minutes with a monitoring time of 516.3 minutes  Aspiratory summary was negative for any significant sleep apnea with overall AHI of 2.2 which was normal in all positions  No central apnea noted  No significant hypoxemia with a alexander desaturation down to 88% with only 0.3 minutes spent below 90%  Percentage of snoring 5.3%  Heart rate was mostly within normal limit with some episode of bradycardia with the lowest heart rate of 39      Impression:  Nondiagnostic sleep study with  low suspicion of any significant sleep apnea  Sleep-related hypoxemia, of no clinical significance with only 0.3 minutes spent below 90%  Primary snoring, mild        Plan:  This was a nondiagnostic sleep study with a low clinical suspicion for any significant sleep apnea specially given the minimal hypoxemia and the lack of any significant snoring  Patient has to follow-up at the sleep center to discuss the results and further recommendations, if patient requires further evaluation for the hypersomnia, a polysomnography followed by multiple sleep latency test will be considered    Assessment and Plan:       ICD-10-CM ICD-9-CM   1. Hypersomnia due to another medical condition  G47.14 327.14   2. Overweight (BMI 25.0-29.9)  E66.3 278.02   3. Snoring  R06.83 786.09       Recommendations:     The patient has significant hypersomnia with no supportive evidence on the home sleep study to justify it.  We might be dealing with a primary hypersomnia disorder that need to be further evaluated with a multiple sleep latency test  We did discuss with the patient the differential diagnosis at this point  No suspected sleep deprivation, the patient is getting adequate sleep  Patient is aware that the home sleep study  may have underestimated the obstructive sleep apnea and that would be further evaluated in more depth on the polysomnography that we will be proceeding with the multiple sleep latency test and it will help us to evaluate for any other possible causes of sleep fragmentation that could have been missed on the home sleep study  Patient was educated about the test and the differential diagnosis and the treatment options if abnormal and she is agreeable to proceed  We will follow-up after the MSLT to discuss the results and the treatment    Orders Placed This Encounter   Procedures    Urine Drug Screen - Urine, Clean Catch    Polysomnography 4 or more parameters    Multiple sleep latency test without CPAP     No orders of the defined types were placed in this encounter.    Return in about 4 weeks (around 9/19/2023).    Shayy Calderon MD   [facility]   08/22/23  09:06 EDT    Dictated utilizing Dragon dictation

## 2023-11-03 DIAGNOSIS — M25.531 RIGHT WRIST PAIN: Primary | ICD-10-CM

## 2023-11-08 ENCOUNTER — OFFICE VISIT (OUTPATIENT)
Dept: ORTHOPEDIC SURGERY | Facility: CLINIC | Age: 43
End: 2023-11-08
Payer: OTHER GOVERNMENT

## 2023-11-08 ENCOUNTER — HOSPITAL ENCOUNTER (OUTPATIENT)
Dept: GENERAL RADIOLOGY | Facility: HOSPITAL | Age: 43
Discharge: HOME | End: 2023-11-08
Admitting: PHYSICIAN ASSISTANT
Payer: OTHER GOVERNMENT

## 2023-11-08 VITALS — TEMPERATURE: 98.6 F | WEIGHT: 168 LBS | HEIGHT: 68 IN | BODY MASS INDEX: 25.46 KG/M2

## 2023-11-08 DIAGNOSIS — M25.531 RIGHT WRIST PAIN: ICD-10-CM

## 2023-11-08 DIAGNOSIS — M25.531 RIGHT WRIST PAIN: Primary | ICD-10-CM

## 2023-11-08 DIAGNOSIS — R20.0 NUMBNESS OF FINGER: ICD-10-CM

## 2023-11-08 PROCEDURE — 99204 OFFICE O/P NEW MOD 45 MIN: CPT | Performed by: PHYSICIAN ASSISTANT

## 2023-11-08 PROCEDURE — 73110 X-RAY EXAM OF WRIST: CPT

## 2023-11-08 NOTE — PROGRESS NOTES
"Chief Complaint  Pain and Establish Care of the Right Wrist    Subjective    History of Present Illness      Domenica Alcala is a 43 y.o. female who presents to [facility] for right wrist pain.    The patient reports that the right wrist pain started back in 05/2023. She woke up one morning with excruciating pain. Her wrist was red and swollen. She could not move her right pinky finger. The right wrist pain lasted for 1 week. She then felt a retractable/snapping sensation in her right wrist. She had pain afterwards; however, it was not as severe as it was. She went to the doctor who instructed her to wear a right wrist brace, which she did for 6 weeks. She also completed occupational therapy for 2 months and her right wrist pain improved; however, she is still having a dull ache with numbness in her right pinky finger, which occurs several times a day. There will be certain right wrist movements that cause aching and numbness in her right pinky finger. The pain and numbness resolve within a few minutes. She could not recall any injury; however, she had been weightlifting. She has been having unusual pains in her right wrist. She denies having any pain anywhere else in the body. She can make a fist okay with no pain.      Objective   Vital Signs:   Temp 98.6 °F (37 °C)   Ht 172.7 cm (68\")   Wt 76.2 kg (168 lb)   BMI 25.54 kg/m²       Physical Exam  Vitals and nursing note reviewed.   Constitutional:       Appearance: Normal appearance.   Pulmonary:      Effort: Pulmonary effort is normal.   Skin:     General: Skin is warm and dry.      Capillary Refill: Capillary refill takes less than 2 seconds.   Neurological:      General: No focal deficit present.      Mental Status: She is alert and oriented to person, place, and time. Mental status is at baseline.   Psychiatric:         Mood and Affect: Mood normal.         Behavior: Behavior normal.         Thought Content: Thought content " normal.         Judgment: Judgment normal.         Ortho Exam   RIGHT wrist  There is a minimal amount of swelling and tenderness along the 1st dorsal compartment tendons.   Abduction of the thumb is uncomfortable.   There is no pain over the radial styloid process.   Finkelstein sign is positive.   Ulnar deviation of the wrist is also uncomfortable.   Capillary refill is 2 seconds with a brisk return. Radial artery pulses are palpable.   Median nerve function is well preserved.   There is some amount of inflammation over the dorsal extensor tendon sheaths over the remaining radial carpal compartments dorsally.    Patient is right hand dominant.   Positive tenderness with palpation of the space distal to the ulnar styloid.  Distal pulses palpable.   No deficits of sensation or motor function noted.   There is no obvious deformity.   Flexor and extensor tendon functions are well preserved.   The DRUJ is stable.    Radial artery pulses are palpable.      Result Review :   Radiologic studies - see below for interpretation  RIGHT wrist xrays   Three views were ordered by Gordon Avendaño PA-C. Performed at [facility] on 11/08/2023. Images were independently viewed and interpreted by myself, my impression as follows:  Findings: Joint spaces well preserved, minimal, if any, arthritic change.  Bony lesion: no  Soft tissues: within normal limits  Joint spaces: normal  Hardware appropriately positioned: not applicable  Prior studies available for comparison: no      Reviewed MRI report of right wrist, performed at  [facility] on 9/13/2023, summary of impression below:   Normal carpal alignment  No focal marrow edema  Type II lunate  Scapholunate and lunotriquetral ligaments appear intact  TFCC and intrinsic wrist ligaments appear intact  Questionable increased fluid along extensor tendons at the cross over the first and second compartment tendons, correlate clinically for intersection  syndrome with some tenosynovitis along the first compartment dorsal tendons within distal forearm  Tendons unremarkable  Remaining dorsal extensor compartments appear normal  Flexor tendons appear normal  Normal-appearing carpal tunnel and Guyon's canal  Median and ulnar nerve appear normal        PROCEDURE  Procedures           Assessment   Assessment and Plan    Problem List Items Addressed This Visit          Musculoskeletal and Injuries    Right wrist pain - Primary    Relevant Orders    EMG & Nerve Conduction Test       Symptoms and Signs    Numbness of finger    Relevant Orders    EMG & Nerve Conduction Test           PLAN   Discussion of any imaging in detail. Discussion of orthopaedic goals.  Risk, benefits, and merits of treatment alternatives reviewed with the patient. Treatment alternatives include: further imaging/testing with EMG/NCS of the right upper extremity. Discussed referral to hand and arm surgery if needed.   Ice, heat, and/or modalities as beneficial  Patient is encouraged to call or return for any issues or concerns.  Follow up once results are back  Patient was given instructions and counseling regarding her condition or for health maintenance advice. Please see specific information pulled into the AVS if appropriate.     Gordon Avendaño PA-C   Date of Encounter: 11/8/2023       Transcribed from ambient dictation for Gordon Avendaño PA-C by Toshia Stone.  11/08/23   11:35 EST    Patient or patient representative verbalized consent to the visit recording.  I have personally performed the services described in this document as transcribed by the above individual, and it is both accurate and complete.

## 2023-11-10 ENCOUNTER — PATIENT ROUNDING (BHMG ONLY) (OUTPATIENT)
Dept: ORTHOPEDIC SURGERY | Facility: CLINIC | Age: 43
End: 2023-11-10
Payer: OTHER GOVERNMENT

## 2023-11-10 NOTE — PROGRESS NOTES
November 10, 2023      A [employer] Message has been sent to the patient for PATIENT R[place]ING [place] [facility]

## 2023-11-30 ENCOUNTER — DOCUMENTATION (OUTPATIENT)
Dept: PHYSICAL THERAPY | Facility: CLINIC | Age: 43
End: 2023-11-30
Payer: OTHER GOVERNMENT

## 2023-11-30 NOTE — PROGRESS NOTES
Discharge Summary  Discharge Summary from Occupational Therapy Report    Patient Information  Domenica Alcala  1980    Dates OT visit: 23-23  Number of Visits: 8     Discharge Status of Patient:   Pain  Current pain ratin  Location: ulnar side of wrist        Domenica Alcala reports: no changes.     Objective            Observations      Right Wrist/Hand   Negative for deformity and edema.         Tenderness      Additional Tenderness Details  No tenderness to palpation     Neurological Testing      Sensation      Wrist/Hand      Right   Intact: light touch     Additional Neurological Details  Occasional Tingling in small finger     Active Range of Motion      Left Wrist   Wrist flexion: 80 degrees   Wrist extension: 55 degrees   Radial deviation: 15 degrees   Ulnar deviation: 40 degrees      Right Wrist   Wrist flexion: 80 degrees   Wrist extension: 55 degrees   Radial deviation: 15 degrees   Ulnar deviation: 30 degrees      Strength/Myotome Testing      Left Wrist/Hand       (2nd hand position)     Trial 1: 66 lbs    Trial 2: 67 lbs    Trial 3: 67 lbs    Average: 66.67 lbs     Thumb Strength  Key/Lateral Pinch     Trial 1: 20 lbs    Trial 2: 18 lbs    Trial 3: 19 lbs    Average: 19 lbs     Right Wrist/Hand   Wrist extension: 4+  Wrist flexion: 4+      (2nd hand position)     Trial 1: 64 lbs    Trial 2: 57 lbs    Trial 3: 69 lbs    Average: 63.33 lbs     Thumb Strength   Key/Lateral Pinch     Trial 1: 20 lbs    Trial 2: 20 lbs    Trial 3: 20 lbs    Average: 20 lbs     Tests      Right Elbow   Positive Tinel's sign (cubital tunnel).      Additional Tests Details  + Tinels Guyon's canal      Goals: Partially Met    Visit Diagnoses:  No diagnosis found.    Discharge Plan: Patient to return to referring/providing physician    Comments Pt referred back to physician for further testing.    Date of Discharge 23        DILMA Ortiz/L  Occupational Therapist  License number [id number]

## 2024-02-19 ENCOUNTER — HOSPITAL ENCOUNTER (EMERGENCY)
Facility: HOSPITAL | Age: 44
Discharge: HOME | End: 2024-02-19
Attending: EMERGENCY MEDICINE | Admitting: EMERGENCY MEDICINE
Payer: OTHER GOVERNMENT

## 2024-02-19 ENCOUNTER — APPOINTMENT (OUTPATIENT)
Dept: GENERAL RADIOLOGY | Facility: HOSPITAL | Age: 44
End: 2024-02-19
Payer: OTHER GOVERNMENT

## 2024-02-19 VITALS
SYSTOLIC BLOOD PRESSURE: 141 MMHG | OXYGEN SATURATION: 99 % | TEMPERATURE: 98.1 F | HEIGHT: 68 IN | WEIGHT: 168 LBS | HEART RATE: 65 BPM | BODY MASS INDEX: 25.46 KG/M2 | RESPIRATION RATE: 20 BRPM | DIASTOLIC BLOOD PRESSURE: 92 MMHG

## 2024-02-19 DIAGNOSIS — M54.50 LUMBAR PAIN: Primary | ICD-10-CM

## 2024-02-19 PROCEDURE — 97161 PT EVAL LOW COMPLEX 20 MIN: CPT | Performed by: PHYSICAL THERAPIST

## 2024-02-19 PROCEDURE — 25010000002 KETOROLAC TROMETHAMINE PER 15 MG

## 2024-02-19 PROCEDURE — 96372 THER/PROPH/DIAG INJ SC/IM: CPT

## 2024-02-19 PROCEDURE — 97110 THERAPEUTIC EXERCISES: CPT | Performed by: PHYSICAL THERAPIST

## 2024-02-19 PROCEDURE — 99283 EMERGENCY DEPT VISIT LOW MDM: CPT

## 2024-02-19 PROCEDURE — 72100 X-RAY EXAM L-S SPINE 2/3 VWS: CPT

## 2024-02-19 RX ORDER — METHYLPREDNISOLONE 4 MG/1
TABLET ORAL
Qty: 21 TABLET | Refills: 0 | Status: SHIPPED | OUTPATIENT
Start: 2024-02-19 | End: 2024-02-25

## 2024-02-19 RX ORDER — KETOROLAC TROMETHAMINE 10 MG/1
10 TABLET, FILM COATED ORAL EVERY 6 HOURS PRN
Qty: 20 TABLET | Refills: 0 | Status: SHIPPED | OUTPATIENT
Start: 2024-02-19 | End: 2024-02-19 | Stop reason: SDUPTHER

## 2024-02-19 RX ORDER — KETOROLAC TROMETHAMINE 10 MG/1
10 TABLET, FILM COATED ORAL EVERY 6 HOURS PRN
Qty: 20 TABLET | Refills: 0 | Status: SHIPPED | OUTPATIENT
Start: 2024-02-19 | End: 2024-02-24

## 2024-02-19 RX ORDER — CYCLOBENZAPRINE HCL 10 MG
10 TABLET ORAL 3 TIMES DAILY PRN
Qty: 20 TABLET | Refills: 0 | Status: SHIPPED | OUTPATIENT
Start: 2024-02-19

## 2024-02-19 RX ORDER — CYCLOBENZAPRINE HCL 10 MG
10 TABLET ORAL 3 TIMES DAILY PRN
Qty: 20 TABLET | Refills: 0 | Status: SHIPPED | OUTPATIENT
Start: 2024-02-19 | End: 2024-02-19 | Stop reason: SDUPTHER

## 2024-02-19 RX ORDER — CYCLOBENZAPRINE HCL 10 MG
10 TABLET ORAL ONCE
Status: COMPLETED | OUTPATIENT
Start: 2024-02-19 | End: 2024-02-19

## 2024-02-19 RX ORDER — KETOROLAC TROMETHAMINE 30 MG/ML
60 INJECTION, SOLUTION INTRAMUSCULAR; INTRAVENOUS ONCE
Status: COMPLETED | OUTPATIENT
Start: 2024-02-19 | End: 2024-02-19

## 2024-02-19 RX ORDER — METHYLPREDNISOLONE 4 MG/1
TABLET ORAL
Qty: 21 TABLET | Refills: 0 | Status: SHIPPED | OUTPATIENT
Start: 2024-02-19 | End: 2024-02-19 | Stop reason: SDUPTHER

## 2024-02-19 RX ADMIN — KETOROLAC TROMETHAMINE 60 MG: 60 INJECTION, SOLUTION INTRAMUSCULAR at 16:18

## 2024-02-19 RX ADMIN — CYCLOBENZAPRINE 10 MG: 10 TABLET, FILM COATED ORAL at 16:18

## 2024-02-19 NOTE — Clinical Note
[facility] EMERGENCY ROOM  [address] [place] [address]  Phone: [phone]    Domenica Alcala was seen and treated in our emergency department on 2/19/2024.  She may return to work on 02/21/2024.  No heavy lifting, pushing, or pulling for the next 5 days. Also, no prolonged standing.       Thank you for choosing [facility].    Teresita Corley APRN

## 2024-02-19 NOTE — ED PROVIDER NOTES
Time: 2:14 PM EST  Date of encounter:  2/19/2024  Independent Historian/Clinical History and Information was obtained by:   Patient    History is limited by: N/A    Chief Complaint: Back pain      History of Present Illness:  Patient is a 43 y.o. year old female who presents to the emergency department for evaluation of low back pain for the past couple weeks, increased in severity earlier today.  Patient reports pain increases with movement but rates the pain 4/10 at rest.  Patient denies any numbness and tingling, denies radiating pain, no saddle anesthesia.    HPI    Patient Care Team  Primary Care Provider: Meredith Maddox MD    Past Medical History:     No Known Allergies  Past Medical History:   Diagnosis Date    Acid reflux     Arthritis     Asthma      History reviewed. No pertinent surgical history.  Family History   Problem Relation Age of Onset    Sleep apnea Mother     Sleep apnea Father        Home Medications:  Prior to Admission medications    Medication Sig Start Date End Date Taking? Authorizing Provider   Acetaminophen (Tylenol) 325 MG capsule As Needed.    Payal Woods MD   Advair Diskus 100-50 MCG/ACT DISKUS  5/31/23   Payal Woods MD   brompheniramine-pseudoephedrine-DM 30-2-10 MG/5ML syrup Take 5 mL by mouth 4 (Four) Times a Day As Needed for Congestion or Cough. 12/4/23   Holly Kumari APRN   diclofenac (VOLTAREN) 75 MG EC tablet As Needed. 8/4/23   Payal Woods MD   famotidine (PEPCID) 20 MG tablet 1 tablet Daily. 6/29/23   Payal Woods MD   montelukast (SINGULAIR) 10 MG tablet Take 1 tablet by mouth Every Night.    Payal Woods MD   naproxen (NAPROSYN) 500 MG tablet  4/10/23   Payal Woods MD   pantoprazole (PROTONIX) 40 MG EC tablet  4/10/23   Payal Woods MD   ProAir  (90 Base) MCG/ACT inhaler  4/10/23   Payal Woods MD        Social History:   Social History     Tobacco Use    Smoking status: Never  "   Smokeless tobacco: Never   Vaping Use    Vaping Use: Never used   Substance Use Topics    Alcohol use: Never    Drug use: Never         Review of Systems:  Review of Systems   Constitutional:  Negative for fever.   HENT:  Negative for sore throat.    Eyes: Negative.    Respiratory:  Negative for cough and shortness of breath.    Cardiovascular:  Negative for chest pain.   Gastrointestinal:  Negative for abdominal pain, diarrhea and vomiting.   Genitourinary:  Negative for dysuria.   Musculoskeletal:  Positive for back pain. Negative for neck pain.   Skin:  Negative for rash.   Allergic/Immunologic: Negative.    Neurological:  Negative for weakness, numbness and headaches.   Hematological: Negative.    Psychiatric/Behavioral: Negative.     All other systems reviewed and are negative.       Physical Exam:  /92 (BP Location: Right arm, Patient Position: Sitting)   Pulse 65   Temp 98.1 °F (36.7 °C) (Oral)   Resp 20   Ht 172.7 cm (68\")   Wt 76.2 kg (168 lb)   LMP  (LMP Unknown) Comment: IUD  SpO2 99%   BMI 25.54 kg/m²     Physical Exam  Vitals and nursing note reviewed.   Constitutional:       General: She is not in acute distress.     Appearance: Normal appearance. She is not toxic-appearing.   HENT:      Head: Normocephalic and atraumatic.      Jaw: There is normal jaw occlusion.   Eyes:      General: Lids are normal.      Extraocular Movements: Extraocular movements intact.      Conjunctiva/sclera: Conjunctivae normal.      Pupils: Pupils are equal, round, and reactive to light.   Cardiovascular:      Rate and Rhythm: Normal rate and regular rhythm.      Pulses: Normal pulses.      Heart sounds: Normal heart sounds.   Pulmonary:      Effort: Pulmonary effort is normal. No respiratory distress.      Breath sounds: Normal breath sounds. No wheezing or rhonchi.   Abdominal:      General: Abdomen is flat.      Palpations: Abdomen is soft.      Tenderness: There is no abdominal tenderness. There is no " guarding or rebound.   Musculoskeletal:         General: Normal range of motion.      Cervical back: Normal range of motion and neck supple.      Lumbar back: Tenderness and bony tenderness present.      Right lower leg: No edema.      Left lower leg: No edema.   Skin:     General: Skin is warm and dry.   Neurological:      Mental Status: She is alert and oriented to person, place, and time. Mental status is at baseline.   Psychiatric:         Mood and Affect: Mood normal.              Procedures:  Procedures      Medical Decision Making:      Comorbidities that affect care:    Asthma    External Notes reviewed:    Previous Clinic Note: Urgent care visit from 12/4/2023 for mild intermittent asthma without complication      The following orders were placed and all results were independently analyzed by me:  Orders Placed This Encounter   Procedures    XR Spine Lumbar 2 or 3 View    PT Consult: Eval & Treat Functional Mobility Below Baseline    PT Plan of Care Cert / Re-Cert       Medications Given in the Emergency Department:  Medications   ketorolac (TORADOL) injection 60 mg (60 mg Intramuscular Given 2/19/24 1618)   cyclobenzaprine (FLEXERIL) tablet 10 mg (10 mg Oral Given 2/19/24 1618)        ED Course:         Labs:    Lab Results (last 24 hours)       ** No results found for the last 24 hours. **             Imaging:    XR Spine Lumbar 2 or 3 View    Result Date: 2/19/2024  PROCEDURE: XR SPINE LUMBAR 2 OR 3 VW  COMPARISON: None  INDICATIONS: LOW BACK PAIN X TODAY, NO KNOWN INJURY  FINDINGS:  The vertebral body heights appear well maintained.  The alignment seems reasonable.  There is facet arthropathy in the lower lumbar spine.  There is a slight dextroscoliosis.        1. Slight dextroscoliosis. 2. Facet arthropathy suggested in the lower lumbar spine.     PRASHANTH YARBROUGH MD       Electronically Signed and Approved By: PRASHANTH YARBROUGH MD on 2/19/2024 at 14:33                Differential Diagnosis and  Discussion:    Back Pain: The patient presents with back pain. My differential diagnosis includes but is not limited to acute spinal epidural abscess, acute spinal epidural bleed, cauda equina syndrome, abdominal aortic aneurysm, aortic dissection, kidney stone, pyelonephritis, musculoskeletal back pain, spinal fracture, and osteoarthritis.     All X-rays impressions were independently interpreted by me.    MDM     Amount and/or Complexity of Data Reviewed  Tests in the radiology section of CPT®: reviewed       The patient´s symptoms are consistent with musculoskeletal back pain. The patient is now resting comfortably, feels better, is alert, talkative, interactive and in no distress. The repeat examination is unremarkable and benign. The patient is neurologically intact and is ambulatory in the ED. The patient has no fever, no bowel or bladder incontinence, no saddle anesthesia, and is otherwise alert and well appearing. The history, physical exam, and diagnostics (if any) do not suggest the presence of acute spinal epidural abscess, acute spinal epidural bleed, cauda equina syndrome, abdominal aortic aneurysm, aortic dissection or other process requiring further testing, treatment or consultation in the emergency department. The vital signs have been stable. The patient's condition is stable and appropriate for discharge. The patient will pursue further outpatient evaluation with the primary care physician or other designated for consulting position as indicated in the discharge instructions.          Patient Care Considerations:    MRI: I considered ordering an MRI however no neurological deficits, no saddle anesthesia.      Consultants/Shared Management Plan:    Consultant: I have discussed the case with Keya Atwood PT who states she has evaluated patient and has provided therapeutic exercises.    Social Determinants of Health:    Patient is independent, reliable, and has access to care.       Disposition and  Care Coordination:    Discharged: The patient is suitable and stable for discharge with no need for consideration of admission.    I have explained the patient´s condition, diagnoses and treatment plan based on the information available to me at this time. I have answered questions and addressed any concerns. The patient has a good  understanding of the patient´s diagnosis, condition, and treatment plan as can be expected at this point. The vital signs have been stable. The patient´s condition is stable and appropriate for discharge from the emergency department.      The patient will pursue further outpatient evaluation with the primary care physician or other designated or consulting physician as outlined in the discharge instructions. They are agreeable to this plan of care and follow-up instructions have been explained in detail. The patient has received these instructions in written format and has expressed an understanding of the discharge instructions. The patient is aware that any significant change in condition or worsening of symptoms should prompt an immediate return to this or the closest emergency department or call to 911.  I have explained discharge medications and the need for follow up with the patient/caretakers. This was also printed in the discharge instructions. Patient was discharged with the following medications and follow up:      Medication List        New Prescriptions      cyclobenzaprine 10 MG tablet  Commonly known as: FLEXERIL  Take 1 tablet by mouth 3 (Three) Times a Day As Needed for Muscle Spasms.     ketorolac 10 MG tablet  Commonly known as: TORADOL  Take 1 tablet by mouth Every 6 (Six) Hours As Needed for Moderate Pain for up to 5 days.     methylPREDNISolone 4 MG dose pack  Commonly known as: MEDROL  Take 6 tablets by mouth Daily for 1 day, THEN 5 tablets Daily for 1 day, THEN 4 tablets Daily for 1 day, THEN 3 tablets Daily for 1 day, THEN 2 tablets Daily for 1 day, THEN 1  tablet Daily for 1 day. Take as directed on package instructions.  Start taking on: February 19, 2024            Stop      diclofenac 75 MG EC tablet  Commonly known as: VOLTAREN     naproxen 500 MG tablet  Commonly known as: NAPROSYN               Where to Get Your Medications        These medications were sent to [id number] - [place], [place] - [address] AT [facility] - [phone]  - [phone]   [address] [place] [address]      Phone: [phone]   cyclobenzaprine 10 MG tablet  ketorolac 10 MG tablet  methylPREDNISolone 4 MG dose pack      Meredith Maddox MD  [address] [place] [address] [phone]    Call in 2 days         Final diagnoses:   Lumbar pain        ED Disposition       ED Disposition   Discharge    Condition   Stable    Comment   --               This medical record created using voice recognition software.             Teresita Corley APRN  02/19/24 1723       Teresita Corley APRN  02/19/24 1736

## 2024-02-19 NOTE — Clinical Note
[facility] EMERGENCY ROOM  [address] [place] [address]  Phone: [phone]    Domenica Alcala was seen and treated in our emergency department on 2/19/2024.  She may return to work on 02/21/2024.  No heavy lifting, pushing, or pulling for the next 5 days. Also, no prolonged standing.       Thank you for choosing [facility].    Teresita Corley APRN

## 2024-02-19 NOTE — THERAPY EVALUATION
Patient Name: Domenica Alcala  : 1980    MRN: [id number]                              Today's Date: 2024       Admit Date: 2024    Visit Dx:     ICD-10-CM ICD-9-CM   1. Lumbar pain  M54.50 724.2     Patient Active Problem List   Diagnosis    Suspected sleep apnea    Hypersomnia due to another medical condition    Snoring    Witnessed apneic spells    Intermittent asthma without complication    Overweight (BMI 25.0-29.9)    Right wrist pain    Numbness of finger     Past Medical History:   Diagnosis Date    Acid reflux     Arthritis     Asthma      History reviewed. No pertinent surgical history.   General Information       Row Name 24 1353          Physical Therapy Time and Intention    Document Type evaluation  -LR     Mode of Treatment individual therapy  -LR       Row Name 24 1353          General Information    Patient Profile Reviewed yes  -LR     Prior Level of Function independent:  -LR               User Key  (r) = Recorded By, (t) = Taken By, (c) = Cosigned By      Initials Name Provider Type    LR Keya Atwood, PT Physical Therapist                  History: Patient reports she has had dull low back pain for 2 weeks now but it significantly increased today when she went to get out of bed.  She states the pain is in the middle of her back and off to the left side.  She denies any numbness or tingling or symptoms into her legs.  She denies a history of low back pain.  Patient's pain is a 5/10 at rest and a 9/10 with movement.  She has taken naproxen and used a lidocaine patch and has also tried ice.  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  Patient states she did do some heavy lifting on Wednesday of last week but did not feel any pain at the time.    Objective:    Palpation: Tender to palpation at L2-L5 spinous processes and left lumbar paraspinals at this level; L quadratus lumborum    ROM:  Lumbar ROM: Patient positioned in 25 degrees of trunk flexion upon standing; she is  unable to move through lumbar range of motion    Bilateral hip, knee, and ankle ROM WNL     Strength:  L Hip MMT:   R Hip MMT:  Flexion: 4/5  Flexion: 4/5  Abduction: 5/5  Abduction: 5/5  Adduction: 4+/5 Adduction: 4+/5    L Knee MMT:  R Knee MMT:  Flexion: 5/5  Flexion: 5/5  Extension: 5/5  Extension: 5/5    L Ankle MMT:  R Ankle MMT:  DF: 5/5  DF: 5/5  PF: 5/5   PF: 5/5    Special Tests:  Quadrant Test: NT  Slump Test: negative B  Straight Leg Raise Test: negative B  Contralateral Straight Leg Raise Test: NT  Obers Test: NT  FABERs Test: positive B  S[place] Test: positive     Sensation: B LE sensation intact to light touch    Assessment/Plan:   Pt presents with a diagnosis of low back pain and has decreased lumbar ROM that are limiting her ability to stand and walk.   The patient performed quadratus lumborum stretches and lumbar ROM exercises.  Quadratus lumborum release was also performed.  Pt was provided with a HEP handout and instructed to perform two times per day.    Goals:   LTG 1: The patient will be independent in HEP in order to decrease pain and improve tolerance to functional activities.  STATUS: Met    Interventions:   Manual Therapy: quadratus lumborum release in R sidelying    Therapeutic Exercises: supine quadratus lumborum stretch (4x20 seconds L), R sidelying quadratus lumborum stretch (4x20 seconds L), sidelying trunk rotation stretch (4x20 seconds L), prone on elbows, cat/cow    Modalities: not performed      Outcome Measures       Row Name 02/19/24 1353          Optimal Instrument    Optimal Instrument Optimal - 3  -LR     Bending/Stooping 5  -LR     Standing 4  -LR     Walking - short distance 4  -LR     From the list, choose the 3 activities you would most like to be able to do without any difficulty Bending/stooping;Standing;Walking -short distance  -LR     Total Score Optimal - 3 13  -LR       Row Name 02/19/24 1353          Functional Assessment    Outcome Measure Options Optimal  Instrument  -LR               User Key  (r) = Recorded By, (t) = Taken By, (c) = Cosigned By      Initials Name Provider Type    LR Keya Atwood, PT Physical Therapist                     Time Calculation:   PT Evaluation Complexity  History, PT Evaluation Complexity: 1-2 personal factors and/or comorbidities  Examination of Body Systems (PT Eval Complexity): 1-2 elements  Clinical Presentation (PT Evaluation Complexity): stable  Clinical Decision Making (PT Evaluation Complexity): low complexity  Overall Complexity (PT Evaluation Complexity): low complexity     PT Charges       Row Name 02/19/24 1506             Time Calculation    PT Received On 02/19/24  -LR         Timed Charges    55934 - PT Therapeutic Exercise Minutes 8  -LR      26384 - PT Manual Therapy Minutes 6  -LR         Untimed Charges    PT Eval/Re-eval Minutes 16  -LR         Total Minutes    Timed Charges Total Minutes 14  -LR      Untimed Charges Total Minutes 16  -LR       Total Minutes 30  -LR                User Key  (r) = Recorded By, (t) = Taken By, (c) = Cosigned By      Initials Name Provider Type    LR Keya Atwood, PT Physical Therapist                  Therapy Charges for Today       Code Description Service Date Service Provider Modifiers Qty    [id number]  PT THER PROC EA 15 MIN 2/19/2024 Keya Atwood, PT GP 1    [id number] HC PT EVAL LOW COMPLEXITY 2 2/19/2024 Keya Atwood, PT GP 1            PT G-Codes  Outcome Measure Options: Optimal Instrument       Keya Atwood, PT  2/19/2024

## 2024-02-27 ENCOUNTER — OFFICE (AMBULATORY)
Dept: URBAN - METROPOLITAN AREA CLINIC 76 | Facility: CLINIC | Age: 44
End: 2024-02-27
Payer: OTHER GOVERNMENT

## 2024-02-27 VITALS
HEIGHT: 69 IN | DIASTOLIC BLOOD PRESSURE: 80 MMHG | HEART RATE: 78 BPM | OXYGEN SATURATION: 95 % | WEIGHT: 180 LBS | SYSTOLIC BLOOD PRESSURE: 120 MMHG

## 2024-02-27 DIAGNOSIS — R10.12 LEFT UPPER QUADRANT PAIN: ICD-10-CM

## 2024-02-27 DIAGNOSIS — K59.09 OTHER CONSTIPATION: ICD-10-CM

## 2024-02-27 DIAGNOSIS — K21.9 GASTRO-ESOPHAGEAL REFLUX DISEASE WITHOUT ESOPHAGITIS: ICD-10-CM

## 2024-02-27 DIAGNOSIS — R13.10 DYSPHAGIA, UNSPECIFIED: ICD-10-CM

## 2024-02-27 PROCEDURE — 99214 OFFICE O/P EST MOD 30 MIN: CPT | Performed by: NURSE PRACTITIONER

## 2024-02-27 RX ORDER — AMITRIPTYLINE HYDROCHLORIDE 10 MG/1
TABLET, FILM COATED ORAL
Qty: 180 | Refills: 2 | Status: COMPLETED
Start: 2024-02-27 | End: 2024-07-31

## 2024-02-27 RX ORDER — OMEPRAZOLE 40 MG/1
40 CAPSULE, DELAYED RELEASE ORAL
Qty: 90 | Refills: 3 | Status: ACTIVE
Start: 2024-02-27

## 2024-03-01 ENCOUNTER — HOSPITAL ENCOUNTER (EMERGENCY)
Facility: HOSPITAL | Age: 44
Discharge: HOME | End: 2024-03-01
Attending: EMERGENCY MEDICINE
Payer: OTHER GOVERNMENT

## 2024-03-01 ENCOUNTER — APPOINTMENT (OUTPATIENT)
Dept: CT IMAGING | Facility: HOSPITAL | Age: 44
End: 2024-03-01
Payer: OTHER GOVERNMENT

## 2024-03-01 VITALS
HEART RATE: 64 BPM | BODY MASS INDEX: 25.73 KG/M2 | OXYGEN SATURATION: 100 % | TEMPERATURE: 97.9 F | RESPIRATION RATE: 16 BRPM | DIASTOLIC BLOOD PRESSURE: 85 MMHG | WEIGHT: 169.75 LBS | HEIGHT: 68 IN | SYSTOLIC BLOOD PRESSURE: 121 MMHG

## 2024-03-01 DIAGNOSIS — Z30.431 IUD CHECK UP: ICD-10-CM

## 2024-03-01 DIAGNOSIS — R10.32 LEFT LOWER QUADRANT ABDOMINAL PAIN: Primary | ICD-10-CM

## 2024-03-01 LAB
ALBUMIN SERPL-MCNC: 4.4 G/DL (ref 3.5–5.2)
ALBUMIN/GLOB SERPL: 1.7 G/DL
ALP SERPL-CCNC: 51 U/L (ref 39–117)
ALT SERPL W P-5'-P-CCNC: 15 U/L (ref 1–33)
ANION GAP SERPL CALCULATED.3IONS-SCNC: 9 MMOL/L (ref 5–15)
AST SERPL-CCNC: 11 U/L (ref 1–32)
BASOPHILS # BLD AUTO: 0.04 10*3/MM3 (ref 0–0.2)
BASOPHILS NFR BLD AUTO: 0.6 % (ref 0–1.5)
BILIRUB SERPL-MCNC: 0.4 MG/DL (ref 0–1.2)
BILIRUB UR QL STRIP: NEGATIVE
BUN SERPL-MCNC: 10 MG/DL (ref 6–20)
BUN/CREAT SERPL: 10.8 (ref 7–25)
CALCIUM SPEC-SCNC: 9.2 MG/DL (ref 8.6–10.5)
CHLORIDE SERPL-SCNC: 105 MMOL/L (ref 98–107)
CLARITY UR: CLEAR
CO2 SERPL-SCNC: 25 MMOL/L (ref 22–29)
COLOR UR: YELLOW
CREAT SERPL-MCNC: 0.93 MG/DL (ref 0.57–1)
DEPRECATED RDW RBC AUTO: 38.9 FL (ref 37–54)
EGFRCR SERPLBLD CKD-EPI 2021: 78.9 ML/MIN/1.73
EOSINOPHIL # BLD AUTO: 0.09 10*3/MM3 (ref 0–0.4)
EOSINOPHIL NFR BLD AUTO: 1.3 % (ref 0.3–6.2)
ERYTHROCYTE [DISTWIDTH] IN BLOOD BY AUTOMATED COUNT: 11.9 % (ref 12.3–15.4)
GLOBULIN UR ELPH-MCNC: 2.6 GM/DL
GLUCOSE SERPL-MCNC: 89 MG/DL (ref 65–99)
GLUCOSE UR STRIP-MCNC: NEGATIVE MG/DL
HCG INTACT+B SERPL-ACNC: <0.5 MIU/ML
HCT VFR BLD AUTO: 38.1 % (ref 34–46.6)
HGB BLD-MCNC: 12.8 G/DL (ref 12–15.9)
HGB UR QL STRIP.AUTO: NEGATIVE
HOLD SPECIMEN: NORMAL
HOLD SPECIMEN: NORMAL
IMM GRANULOCYTES # BLD AUTO: 0.02 10*3/MM3 (ref 0–0.05)
IMM GRANULOCYTES NFR BLD AUTO: 0.3 % (ref 0–0.5)
KETONES UR QL STRIP: NEGATIVE
LEUKOCYTE ESTERASE UR QL STRIP.AUTO: NEGATIVE
LIPASE SERPL-CCNC: 41 U/L (ref 13–60)
LYMPHOCYTES # BLD AUTO: 2.11 10*3/MM3 (ref 0.7–3.1)
LYMPHOCYTES NFR BLD AUTO: 29.8 % (ref 19.6–45.3)
MCH RBC QN AUTO: 30.3 PG (ref 26.6–33)
MCHC RBC AUTO-ENTMCNC: 33.6 G/DL (ref 31.5–35.7)
MCV RBC AUTO: 90.1 FL (ref 79–97)
MONOCYTES # BLD AUTO: 0.73 10*3/MM3 (ref 0.1–0.9)
MONOCYTES NFR BLD AUTO: 10.3 % (ref 5–12)
NEUTROPHILS NFR BLD AUTO: 4.1 10*3/MM3 (ref 1.7–7)
NEUTROPHILS NFR BLD AUTO: 57.7 % (ref 42.7–76)
NITRITE UR QL STRIP: NEGATIVE
NRBC BLD AUTO-RTO: 0 /100 WBC (ref 0–0.2)
PH UR STRIP.AUTO: 5.5 [PH] (ref 5–8)
PLATELET # BLD AUTO: 254 10*3/MM3 (ref 140–450)
PMV BLD AUTO: 10 FL (ref 6–12)
POTASSIUM SERPL-SCNC: 4.4 MMOL/L (ref 3.5–5.2)
PROT SERPL-MCNC: 7 G/DL (ref 6–8.5)
PROT UR QL STRIP: NEGATIVE
RBC # BLD AUTO: 4.23 10*6/MM3 (ref 3.77–5.28)
SODIUM SERPL-SCNC: 139 MMOL/L (ref 136–145)
SP GR UR STRIP: 1.02 (ref 1–1.03)
UROBILINOGEN UR QL STRIP: NORMAL
WBC NRBC COR # BLD AUTO: 7.09 10*3/MM3 (ref 3.4–10.8)
WHOLE BLOOD HOLD COAG: NORMAL
WHOLE BLOOD HOLD SPECIMEN: NORMAL

## 2024-03-01 PROCEDURE — 85025 COMPLETE CBC W/AUTO DIFF WBC: CPT

## 2024-03-01 PROCEDURE — 81003 URINALYSIS AUTO W/O SCOPE: CPT

## 2024-03-01 PROCEDURE — 80053 COMPREHEN METABOLIC PANEL: CPT

## 2024-03-01 PROCEDURE — 84702 CHORIONIC GONADOTROPIN TEST: CPT

## 2024-03-01 PROCEDURE — 83690 ASSAY OF LIPASE: CPT

## 2024-03-01 PROCEDURE — 36415 COLL VENOUS BLD VENIPUNCTURE: CPT

## 2024-03-01 PROCEDURE — 74177 CT ABD & PELVIS W/CONTRAST: CPT

## 2024-03-01 PROCEDURE — 25510000001 IOPAMIDOL PER 1 ML: Performed by: EMERGENCY MEDICINE

## 2024-03-01 PROCEDURE — 99285 EMERGENCY DEPT VISIT HI MDM: CPT

## 2024-03-01 RX ORDER — SODIUM CHLORIDE 0.9 % (FLUSH) 0.9 %
10 SYRINGE (ML) INJECTION AS NEEDED
Status: DISCONTINUED | OUTPATIENT
Start: 2024-03-01 | End: 2024-03-01 | Stop reason: HOSPADM

## 2024-03-01 RX ADMIN — IOPAMIDOL 100 ML: 755 INJECTION, SOLUTION INTRAVENOUS at 18:24

## 2024-03-01 NOTE — ED PROVIDER NOTES
Time: 4:36 PM EST  Date of encounter:  3/1/2024  Independent Historian/Clinical History and Information was obtained by:   Patient    History is limited by: N/A    Chief Complaint: Left lower quadrant pain      History of Present Illness:  Patient is a 43 y.o. year old female who presents to the emergency department for evaluation of left lower quadrant abdominal pain x 2 days.  Saw her PCP who sent her here to get a CT of her abdomen to rule out hernia.  Denies recent strenuous activity.  Denies history of ovarian cyst. she denies nausea, vomiting, dysuria and hematuria.    HPI    Patient Care Team  Primary Care Provider: Meredith Maddox MD    Past Medical History:     No Known Allergies  Past Medical History:   Diagnosis Date    Acid reflux     Arthritis     Asthma      Past Surgical History:   Procedure Laterality Date    BONE RESECTION, RIB       SECTION      SHOULDER SURGERY       Family History   Problem Relation Age of Onset    Sleep apnea Mother     Sleep apnea Father        Home Medications:  Prior to Admission medications    Medication Sig Start Date End Date Taking? Authorizing Provider   Acetaminophen (Tylenol) 325 MG capsule As Needed.    Payal Woods MD   Advair Diskus 100-50 MCG/ACT DISKUS  23   Payal Woods MD   brompheniramine-pseudoephedrine-DM 30-2-10 MG/5ML syrup Take 5 mL by mouth 4 (Four) Times a Day As Needed for Congestion or Cough. 23   Holly Kumari APRN   cyclobenzaprine (FLEXERIL) 10 MG tablet Take 1 tablet by mouth 3 (Three) Times a Day As Needed for Muscle Spasms. 24   Teresita Corley APRN   famotidine (PEPCID) 20 MG tablet 1 tablet Daily. 23   Payal Woods MD   montelukast (SINGULAIR) 10 MG tablet Take 1 tablet by mouth Every Night.    Pyaal Woods MD   pantoprazole (PROTONIX) 40 MG EC tablet  4/10/23   Payal Woods MD   ProAir  (90 Base) MCG/ACT inhaler  4/10/23   Payal Woods MD     "    Social History:   Social History     Tobacco Use    Smoking status: Never    Smokeless tobacco: Never   Vaping Use    Vaping Use: Never used   Substance Use Topics    Alcohol use: Never    Drug use: Never         Review of Systems:  Review of Systems   Constitutional:  Negative for chills and fever.   HENT:  Negative for congestion, rhinorrhea and sore throat.    Eyes:  Negative for pain and visual disturbance.   Respiratory:  Negative for apnea, cough, chest tightness and shortness of breath.    Cardiovascular:  Negative for chest pain and palpitations.   Gastrointestinal:  Positive for abdominal pain. Negative for diarrhea, nausea and vomiting.   Genitourinary:  Negative for difficulty urinating and dysuria.   Musculoskeletal:  Negative for joint swelling and myalgias.   Skin:  Negative for color change.   Neurological:  Negative for seizures and headaches.   Psychiatric/Behavioral: Negative.     All other systems reviewed and are negative.       Physical Exam:  /85   Pulse 64   Temp 97.9 °F (36.6 °C) (Oral)   Resp 16   Ht 172.7 cm (68\")   Wt 77 kg (169 lb 12.1 oz)   LMP  (LMP Unknown) Comment: IUD  SpO2 100%   BMI 25.81 kg/m²     Physical Exam  Vitals and nursing note reviewed.   Constitutional:       General: She is not in acute distress.     Appearance: Normal appearance. She is not toxic-appearing.   HENT:      Head: Normocephalic and atraumatic.      Jaw: There is normal jaw occlusion.      Mouth/Throat:      Mouth: Mucous membranes are moist.   Eyes:      General: Lids are normal.      Extraocular Movements: Extraocular movements intact.      Conjunctiva/sclera: Conjunctivae normal.      Pupils: Pupils are equal, round, and reactive to light.   Cardiovascular:      Rate and Rhythm: Normal rate and regular rhythm.      Pulses: Normal pulses.      Heart sounds: Normal heart sounds.   Pulmonary:      Effort: Pulmonary effort is normal. No respiratory distress.      Breath sounds: Normal breath " sounds. No wheezing or rhonchi.   Abdominal:      General: Abdomen is flat. There is no distension.      Palpations: Abdomen is soft.      Tenderness: There is abdominal tenderness in the left lower quadrant. There is no guarding or rebound.   Musculoskeletal:         General: Normal range of motion.      Cervical back: Normal range of motion and neck supple.      Right lower leg: No edema.      Left lower leg: No edema.   Skin:     General: Skin is warm and dry.   Neurological:      General: No focal deficit present.      Mental Status: She is alert and oriented to person, place, and time. Mental status is at baseline.   Psychiatric:         Mood and Affect: Mood normal.         Behavior: Behavior normal.                  Procedures:  Procedures      Medical Decision Making:      Comorbidities that affect care:    Asthma    External Notes reviewed:          The following orders were placed and all results were independently analyzed by me:  Orders Placed This Encounter   Procedures    CT Abdomen Pelvis With Contrast    [place] Draw    Comprehensive Metabolic Panel    Lipase    Urinalysis With Microscopic If Indicated (No Culture) - Urine, Clean Catch    CBC Auto Differential    hCG, Quantitative, Pregnancy    Ambulatory Referral to Gastroenterology    NPO Diet NPO Type: Strict NPO    Undress & Gown    Vital Signs    Insert Peripheral IV    CBC & Differential    Green Top (Gel)    Lavender Top    Gold Top - SST    Light Blue Top       Medications Given in the Emergency Department:  Medications   sodium chloride 0.9 % flush 10 mL (has no administration in time range)   iopamidol (ISOVUE-370) 76 % injection 100 mL (100 mL Intravenous Given 3/1/24 1824)        ED Course:    ED Course as of 03/01/24 2036   Fri Mar 01, 2024   1637 --- PROVIDER IN TRIAGE NOTE ---    The patient was evaluated by me, Javier Kearney in triage. Orders were placed and the patient is currently awaiting disposition.    [AJ]      ED Course User  Index  [AJ] Javier Kearney PA-C       Labs:    Lab Results (last 24 hours)       Procedure Component Value Units Date/Time    CBC & Differential [[id number]]  (Abnormal) Collected: 03/01/24 1707    Specimen: Blood from Arm, Right Updated: 03/01/24 1746    Narrative:      The following orders were created for panel order CBC & Differential.  Procedure                               Abnormality         Status                     ---------                               -----------         ------                     CBC Auto Differential[[id number]]        Abnormal            Final result                 Please view results for these tests on the individual orders.    Comprehensive Metabolic Panel [[id number]] Collected: 03/01/24 1707    Specimen: Blood from Arm, Right Updated: 03/01/24 1810     Glucose 89 mg/dL      BUN 10 mg/dL      Creatinine 0.93 mg/dL      Sodium 139 mmol/L      Potassium 4.4 mmol/L      Chloride 105 mmol/L      CO2 25.0 mmol/L      Calcium 9.2 mg/dL      Total Protein 7.0 g/dL      Albumin 4.4 g/dL      ALT (SGPT) 15 U/L      AST (SGOT) 11 U/L      Alkaline Phosphatase 51 U/L      Total Bilirubin 0.4 mg/dL      Globulin 2.6 gm/dL      A/G Ratio 1.7 g/dL      BUN/Creatinine Ratio 10.8     Anion Gap 9.0 mmol/L      eGFR 78.9 mL/min/1.73     Narrative:      GFR Normal >60  Chronic Kidney Disease <60  Kidney Failure <15      Lipase [[id number]]  (Normal) Collected: 03/01/24 1707    Specimen: Blood from Arm, Right Updated: 03/01/24 1810     Lipase 41 U/L     CBC Auto Differential [[id number]]  (Abnormal) Collected: 03/01/24 1707    Specimen: Blood from Arm, Right Updated: 03/01/24 1746     WBC 7.09 10*3/mm3      RBC 4.23 10*6/mm3      Hemoglobin 12.8 g/dL      Hematocrit 38.1 %      MCV 90.1 fL      MCH 30.3 pg      MCHC 33.6 g/dL      RDW 11.9 %      RDW-SD 38.9 fl      MPV 10.0 fL      Platelets 254 10*3/mm3      Neutrophil % 57.7 %      Lymphocyte % 29.8 %      Monocyte % 10.3 %      Eosinophil %  1.3 %      Basophil % 0.6 %      Immature Grans % 0.3 %      Neutrophils, Absolute 4.10 10*3/mm3      Lymphocytes, Absolute 2.11 10*3/mm3      Monocytes, Absolute 0.73 10*3/mm3      Eosinophils, Absolute 0.09 10*3/mm3      Basophils, Absolute 0.04 10*3/mm3      Immature Grans, Absolute 0.02 10*3/mm3      nRBC 0.0 /100 WBC     hCG, Quantitative, Pregnancy [[id number]] Collected: 03/01/24 1707    Specimen: Blood from Arm, Right Updated: 03/01/24 1807     HCG Quantitative <0.50 mIU/mL     Narrative:      HCG Ranges by Gestational Age    Females - non-pregnant premenopausal   </= 1mIU/mL HCG  Females - postmenopausal               </= 7mIU/mL HCG    3 Weeks       5.4   -      72 mIU/mL  4 Weeks      10.2   -     708 mIU/mL  5 Weeks       217   -   8,245 mIU/mL  6 Weeks       152   -  32,177 mIU/mL  7 Weeks     4,059   - 153,767 mIU/mL  8 Weeks    31,366   - 149,094 mIU/mL  9 Weeks    59,109   - 135,901 mIU/mL  10 Weeks   44,186   - 170,409 mIU/mL  12 Weeks   27,107   - 201,615 mIU/mL  14 Weeks   24,302   -  93,646 mIU/mL  15 Weeks   12,540   -  69,747 mIU/mL  16 Weeks    8,904   -  55,332 mIU/mL  17 Weeks    8,240   -  51,793 mIU/mL  18 Weeks    9,649   -  55,271 mIU/mL      Urinalysis With Microscopic If Indicated (No Culture) - Urine, Clean Catch [[id number]]  (Normal) Collected: 03/01/24 1716    Specimen: Urine, Clean Catch Updated: 03/01/24 1751     Color, UA Yellow     Appearance, UA Clear     pH, UA 5.5     Specific Gravity, UA 1.018     Glucose, UA Negative     Ketones, UA Negative     Bilirubin, UA Negative     Blood, UA Negative     Protein, UA Negative     Leuk Esterase, UA Negative     Nitrite, UA Negative     Urobilinogen, UA 0.2 E.U./dL    Narrative:      Urine microscopic not indicated.             Imaging:    CT Abdomen Pelvis With Contrast    Result Date: 3/1/2024  PROCEDURE: CT ABDOMEN PELVIS W CONTRAST  COMPARISON: None  INDICATIONS: Abdominal pain  TECHNIQUE: After obtaining the patient's consent, CT  images were created with non-ionic intravenous contrast material.   PROTOCOL:   Standard imaging protocol performed    RADIATION:   DLP: 480.4 mGy*cm   Automated exposure control was utilized to minimize radiation dose. CONTRAST: 80 cc Isovue 370 I.V.  FINDINGS:  The lung bases are clear. The liver, spleen, pancreas, adrenal glands and kidneys have an unremarkable CT appearance.  The gallbladder is present.  There is no biliary dilatation. The bladder is unremarkable.  An IUD is present within the uterus follicles are present the ovaries and there is a small amount of free fluid in the pelvic cul-de-sac likely physiologic.  The appendix is retrocecal in location and normal in appearance.  The colon is redundant .  Overall stool burden is moderate.  There is scant colonic diverticulosis.  No CT signs of diverticulitis.  The small bowel loops are decompressed without focal abnormality.  No fluid collections or acute inflammatory changes.  No pathologically enlarged lymph nodes.  No free intraperitoneal air.  No loculated fluid collections.  The subcutaneous soft tissues are unremarkable.  The abdominal aorta is normal in course and caliber. Bony structures are intact.          No acute intra-abdominal or intrapelvic abnormality.      AMBAR RAM DO       Electronically Signed and Approved By: AMBAR RAM DO on 3/01/2024 at 20:04                Differential Diagnosis and Discussion:    Abdominal Pain: Based on the patient's signs and symptoms, I considered abdominal aortic aneurysm, small bowel obstruction, pancreatitis, acute cholecystitis, acute appendecitis, peptic ulcer disease, gastritis, colitis, endocrine disorders, irritable bowel syndrome and other differential diagnosis an etiology of the patient's abdominal pain.    All labs were reviewed and interpreted by me.  CT scan radiology impression was interpreted by me.    MDM     The patient´s CBC that was reviewed and interpreted by me shows no  abnormalities of critical concern. Of note, there is no anemia requiring a blood transfusion and the platelet count is acceptable.  The patient´s CMP that was reviewed and interpretted by me shows no abnormalities of critical concern. Of note, the patient´s sodium and potassium are acceptable. The patient´s liver enzymes are unremarkable. The patient´s renal function (creatinine) is preserved. The patient has a normal anion gap.  Lipase within normal limits.  Urinalysis shows no evidence of acute UTI.  CT abdomen pelvis with contrast shows no acute intra-abdominal abnormality.  I will provide an ambulatory referral to gastroenterology for follow-up.  Instructed patient to return to ED if she develops any worsening symptoms.  Patient is resting comfortably at this time states she wishes to go home.  Patient states she understands and agrees with plan of care.    Patient states she may be concerned that her IUD has moved.  She states she would like a a referral to OBGYN.  I will provide an ambulatory referral to OB/GYN.  Patient denies vaginal bleeding at this time.      Patient Care Considerations:          Consultants/Shared Management Plan:    None    Social Determinants of Health:    Patient is independent, reliable, and has access to care.       Disposition and Care Coordination:    Discharged: The patient is suitable and stable for discharge with no need for consideration of admission.    I have explained the patient´s condition, diagnoses and treatment plan based on the information available to me at this time. I have answered questions and addressed any concerns. The patient has a good  understanding of the patient´s diagnosis, condition, and treatment plan as can be expected at this point. The vital signs have been stable. The patient´s condition is stable and appropriate for discharge from the emergency department.      The patient will pursue further outpatient evaluation with the primary care physician or  other designated or consulting physician as outlined in the discharge instructions. They are agreeable to this plan of care and follow-up instructions have been explained in detail. The patient has received these instructions in written format and has expressed an understanding of the discharge instructions. The patient is aware that any significant change in condition or worsening of symptoms should prompt an immediate return to this or the closest emergency department or call to 911.  I have explained discharge medications and the need for follow up with the patient/caretakers. This was also printed in the discharge instructions. Patient was discharged with the following medications and follow up:      Medication List      No changes were made to your prescriptions during this visit.      Domeniac Slater MD  [address] [address]  [phone]    Call in 1 week  To set up an appointment for follow-up       Final diagnoses:   Left lower quadrant abdominal pain        ED Disposition       ED Disposition   Discharge    Condition   Stable    Comment   --               This medical record created using voice recognition software.             Kevin Barragan PA-C  03/01/24 2036       Kevin Barragan PA-C  03/01/24 2055

## 2024-07-31 ENCOUNTER — OFFICE (AMBULATORY)
Dept: URBAN - METROPOLITAN AREA CLINIC 76 | Facility: CLINIC | Age: 44
End: 2024-07-31
Payer: OTHER GOVERNMENT

## 2024-07-31 VITALS
HEART RATE: 52 BPM | HEIGHT: 69 IN | SYSTOLIC BLOOD PRESSURE: 110 MMHG | OXYGEN SATURATION: 96 % | DIASTOLIC BLOOD PRESSURE: 80 MMHG | WEIGHT: 167 LBS

## 2024-07-31 DIAGNOSIS — R13.10 DYSPHAGIA, UNSPECIFIED: ICD-10-CM

## 2024-07-31 DIAGNOSIS — K59.09 OTHER CONSTIPATION: ICD-10-CM

## 2024-07-31 DIAGNOSIS — R10.32 LEFT LOWER QUADRANT PAIN: ICD-10-CM

## 2024-07-31 DIAGNOSIS — K21.9 GASTRO-ESOPHAGEAL REFLUX DISEASE WITHOUT ESOPHAGITIS: ICD-10-CM

## 2024-07-31 PROCEDURE — 99214 OFFICE O/P EST MOD 30 MIN: CPT

## 2025-01-24 ENCOUNTER — OFFICE (AMBULATORY)
Dept: URBAN - METROPOLITAN AREA CLINIC 76 | Facility: CLINIC | Age: 45
End: 2025-01-24
Payer: OTHER GOVERNMENT

## 2025-01-24 VITALS
HEIGHT: 69 IN | WEIGHT: 173 LBS | DIASTOLIC BLOOD PRESSURE: 70 MMHG | OXYGEN SATURATION: 99 % | HEART RATE: 60 BPM | SYSTOLIC BLOOD PRESSURE: 112 MMHG

## 2025-01-24 DIAGNOSIS — K59.09 OTHER CONSTIPATION: ICD-10-CM

## 2025-01-24 DIAGNOSIS — R10.32 LEFT LOWER QUADRANT PAIN: ICD-10-CM

## 2025-01-24 DIAGNOSIS — K21.9 GASTRO-ESOPHAGEAL REFLUX DISEASE WITHOUT ESOPHAGITIS: ICD-10-CM

## 2025-01-24 PROCEDURE — 99214 OFFICE O/P EST MOD 30 MIN: CPT

## 2025-01-24 RX ORDER — PRUCALOPRIDE 1 MG/1
TABLET, FILM COATED ORAL
Qty: 30 | Refills: 11 | Status: ACTIVE
Start: 2025-01-24